# Patient Record
Sex: FEMALE | Race: WHITE | NOT HISPANIC OR LATINO | URBAN - METROPOLITAN AREA
[De-identification: names, ages, dates, MRNs, and addresses within clinical notes are randomized per-mention and may not be internally consistent; named-entity substitution may affect disease eponyms.]

---

## 2017-11-23 ENCOUNTER — EMERGENCY (EMERGENCY)
Facility: HOSPITAL | Age: 77
LOS: 0 days | Discharge: HOME | End: 2017-11-24
Attending: EMERGENCY MEDICINE | Admitting: EMERGENCY MEDICINE
Payer: MEDICARE

## 2017-11-23 VITALS
DIASTOLIC BLOOD PRESSURE: 59 MMHG | SYSTOLIC BLOOD PRESSURE: 148 MMHG | WEIGHT: 140.65 LBS | TEMPERATURE: 98 F | OXYGEN SATURATION: 98 % | HEIGHT: 61 IN | HEART RATE: 81 BPM

## 2017-11-23 VITALS — WEIGHT: 130.07 LBS | HEIGHT: 60 IN

## 2017-11-23 DIAGNOSIS — H11.32 CONJUNCTIVAL HEMORRHAGE, LEFT EYE: ICD-10-CM

## 2017-11-23 DIAGNOSIS — H57.9 UNSPECIFIED DISORDER OF EYE AND ADNEXA: ICD-10-CM

## 2017-11-23 PROBLEM — Z00.00 ENCOUNTER FOR PREVENTIVE HEALTH EXAMINATION: Status: ACTIVE | Noted: 2017-11-23

## 2017-11-23 PROCEDURE — 99283 EMERGENCY DEPT VISIT LOW MDM: CPT | Mod: 25

## 2017-11-23 NOTE — ED PROVIDER NOTE - EYES, MLM
Pupils equal bilaterally.  EOMI.  No evidence of entrapment.  +left eye with diffuse conjunctival hemorrhage more concentrated medially and inferiorly.

## 2017-11-23 NOTE — ED ADULT NURSE NOTE - OBJECTIVE STATEMENT
Patient arrived to ED c/o left eye redness. Patient reports about 1 hr ago she was removing her make up when redness started in corner of eye. Patient reports she is on baby ASA. Denies double vision, blurry vision, trauma to the eye. Denies pain but reports it feels "puffy". No active bleeding at this time. Will see ophthalmologist on call tomorrow.

## 2017-11-23 NOTE — ED PROVIDER NOTE - ATTENDING CONTRIBUTION TO CARE
Attending Contribution to Care: I, Elizabeth Blake, performed the initial face to face bedside interview with this patient regarding history of present illness, review of symptoms and relevant past medical, social and family history.  I completed an independent physical examination.  I was the initial provider who evaluated this patient. I have signed out the follow up of any pending tests (i.e. labs, radiological studies) to the ACP.  I have communicated the patient’s plan of care and disposition with the ACP.

## 2017-11-23 NOTE — ED PROVIDER NOTE - PROGRESS NOTE DETAILS
HECTOR Kim:  Pt seen and examined, she presented with c/o of left eye reddness, started 1 hr PTA. Pt was removing the makeup with a makeup removing cloth and after than she noticed the reddness. Initially it was only medially but later it spread to the entire eye. Pt is on 81 mg ASA. She s/w on call Ophthalmologist Dr. Mahajan and has an appointment with him tomorrow. Denies any visual changes, eye pain or any other trauma or complaint. I checked the IOP and it is 21. [facility] 20/20.

## 2017-11-23 NOTE — ED PROVIDER NOTE - OBJECTIVE STATEMENT
Pt. is a 78 yo F BIB  for red left eye.  Pt. states she was removing her makeup with mild to moderate pressure using a cloth and makeup remover and felt a dull sensation on medial side of left eye and then noticed bleeding and clotting forming.  Pt. takes aspirin daily.  No visual change or double vision.  Called her ophthalmologist Dr. Orourke and Dr. Johnson was on call.  He told her to apply cool compresses and he would see her in the AM.  Pt. got worried as the redness was spreading and getting worse so she came into ED for evaluation.  No fever.  No facial trauma except removing her makeup.

## 2018-08-28 ENCOUNTER — APPOINTMENT (OUTPATIENT)
Dept: UROLOGY | Facility: CLINIC | Age: 78
End: 2018-08-28
Payer: MEDICARE

## 2018-08-28 VITALS
SYSTOLIC BLOOD PRESSURE: 177 MMHG | HEIGHT: 62 IN | DIASTOLIC BLOOD PRESSURE: 102 MMHG | BODY MASS INDEX: 24.84 KG/M2 | TEMPERATURE: 97.7 F | WEIGHT: 135 LBS | HEART RATE: 77 BPM

## 2018-08-28 DIAGNOSIS — Z96.649 PRESENCE OF UNSPECIFIED ARTIFICIAL HIP JOINT: ICD-10-CM

## 2018-08-28 DIAGNOSIS — Z87.891 PERSONAL HISTORY OF NICOTINE DEPENDENCE: ICD-10-CM

## 2018-08-28 PROCEDURE — 99204 OFFICE O/P NEW MOD 45 MIN: CPT | Mod: 25

## 2018-08-28 PROCEDURE — 51798 US URINE CAPACITY MEASURE: CPT

## 2018-08-29 PROBLEM — Z87.891 FORMER SMOKER: Status: ACTIVE | Noted: 2018-08-28

## 2018-08-29 PROBLEM — Z96.649 HISTORY OF HIP REPLACEMENT: Status: RESOLVED | Noted: 2018-08-28 | Resolved: 2018-08-29

## 2018-08-29 LAB
APPEARANCE: CLEAR
BACTERIA: NEGATIVE
BILIRUBIN URINE: NEGATIVE
BLOOD URINE: NEGATIVE
COLOR: YELLOW
GLUCOSE QUALITATIVE U: NEGATIVE MG/DL
HYALINE CASTS: 1 /LPF
KETONES URINE: NEGATIVE
LEUKOCYTE ESTERASE URINE: ABNORMAL
MICROSCOPIC-UA: NORMAL
NITRITE URINE: NEGATIVE
PH URINE: 6.5
PROTEIN URINE: NEGATIVE MG/DL
RED BLOOD CELLS URINE: 0 /HPF
SPECIFIC GRAVITY URINE: 1.01
SQUAMOUS EPITHELIAL CELLS: 2 /HPF
UROBILINOGEN URINE: NEGATIVE MG/DL
WHITE BLOOD CELLS URINE: 1 /HPF

## 2018-08-29 RX ORDER — SIMVASTATIN 80 MG/1
TABLET, FILM COATED ORAL
Refills: 0 | Status: ACTIVE | COMMUNITY

## 2018-08-30 LAB — BACTERIA UR CULT: NORMAL

## 2018-10-01 ENCOUNTER — APPOINTMENT (OUTPATIENT)
Dept: UROLOGY | Facility: CLINIC | Age: 78
End: 2018-10-01

## 2018-10-02 ENCOUNTER — APPOINTMENT (OUTPATIENT)
Dept: UROLOGY | Facility: CLINIC | Age: 78
End: 2018-10-02

## 2018-12-17 ENCOUNTER — OUTPATIENT (OUTPATIENT)
Dept: OUTPATIENT SERVICES | Facility: HOSPITAL | Age: 78
LOS: 1 days | Discharge: HOME | End: 2018-12-17

## 2018-12-17 VITALS
RESPIRATION RATE: 16 BRPM | WEIGHT: 134.04 LBS | TEMPERATURE: 98 F | OXYGEN SATURATION: 96 % | HEIGHT: 62 IN | HEART RATE: 46 BPM | DIASTOLIC BLOOD PRESSURE: 88 MMHG | SYSTOLIC BLOOD PRESSURE: 158 MMHG

## 2018-12-17 VITALS
TEMPERATURE: 98 F | DIASTOLIC BLOOD PRESSURE: 63 MMHG | RESPIRATION RATE: 14 BRPM | SYSTOLIC BLOOD PRESSURE: 115 MMHG | HEART RATE: 66 BPM | OXYGEN SATURATION: 100 %

## 2018-12-17 DIAGNOSIS — Z82.69 FAMILY HISTORY OF OTHER DISEASES OF THE MUSCULOSKELETAL SYSTEM AND CONNECTIVE TISSUE: Chronic | ICD-10-CM

## 2018-12-17 DIAGNOSIS — Z98.1 ARTHRODESIS STATUS: Chronic | ICD-10-CM

## 2018-12-17 RX ORDER — PHENYLEPHRINE HCL 2.5 %
1 DROPS OPHTHALMIC (EYE)
Qty: 0 | Refills: 0 | Status: COMPLETED | OUTPATIENT
Start: 2018-12-17 | End: 2018-12-17

## 2018-12-17 RX ORDER — OFLOXACIN 0.3 %
1 DROPS OPHTHALMIC (EYE)
Qty: 0 | Refills: 0 | Status: COMPLETED | OUTPATIENT
Start: 2018-12-17 | End: 2018-12-17

## 2018-12-17 RX ORDER — ACETAMINOPHEN 500 MG
2 TABLET ORAL
Qty: 0 | Refills: 0 | DISCHARGE
Start: 2018-12-17

## 2018-12-17 RX ORDER — TROPICAMIDE 1 %
1 DROPS OPHTHALMIC (EYE)
Qty: 0 | Refills: 0 | Status: COMPLETED | OUTPATIENT
Start: 2018-12-17 | End: 2018-12-17

## 2018-12-17 RX ORDER — ACETAMINOPHEN 500 MG
650 TABLET ORAL EVERY 6 HOURS
Qty: 0 | Refills: 0 | Status: DISCONTINUED | OUTPATIENT
Start: 2018-12-17 | End: 2019-01-01

## 2018-12-17 RX ADMIN — Medication 1 DROP(S): at 09:47

## 2018-12-17 RX ADMIN — Medication 1 DROP(S): at 10:03

## 2018-12-17 RX ADMIN — Medication 1 DROP(S): at 09:53

## 2018-12-17 RX ADMIN — Medication 1 DROP(S): at 10:04

## 2018-12-17 RX ADMIN — Medication 1 DROP(S): at 09:46

## 2018-12-17 NOTE — ASU PATIENT PROFILE, ADULT - PMH
CAD (coronary artery disease)    Cardiac murmur    GERD (gastroesophageal reflux disease)    HTN (hypertension)    Hypercholesteremia    Mitral regurgitation    Osteopenia    Scoliosis

## 2018-12-17 NOTE — ASU DISCHARGE PLAN (ADULT/PEDIATRIC). - MEDICATION SUMMARY - MEDICATIONS TO TAKE
I will START or STAY ON the medications listed below when I get home from the hospital:    meloxicam 15 mg oral tablet  -- 1 tab(s) by mouth once a day  -- Indication: For CATARACT RIGHT EYE    acetaminophen 325 mg oral tablet  -- 2 tab(s) by mouth every 6 hours, As needed, Mild Pain (1 - 3)  -- Indication: For CATARACT RIGHT EYE    losartan 25 mg oral tablet  -- 1 tab(s) by mouth once a day  -- Indication: For CATARACT RIGHT EYE    simvastatin 10 mg oral tablet  -- 1 tab(s) by mouth once a day (at bedtime)  -- Indication: For CATARACT RIGHT EYE    Bystolic 5 mg oral tablet  -- 1 tab(s) by mouth once a day  -- Indication: For CATARACT RIGHT EYE    Vitamin D3 1000 intl units oral tablet  -- 1 tab(s) by mouth once a day  -- Indication: For CATARACT RIGHT EYE

## 2018-12-17 NOTE — BRIEF OPERATIVE NOTE - PROCEDURE
<<-----Click on this checkbox to enter Procedure Cataract extraction of eye with posterior chamber insertion of intraocular lens  12/17/2018    Active  ESMALL

## 2018-12-17 NOTE — BRIEF OPERATIVE NOTE - PRE-OP DX
Age-related nuclear cataract of right eye  12/17/2018    CIARRA Vela  Regular astigmatism of right eye  12/17/2018    CIARRA Vela

## 2018-12-17 NOTE — ASU DISCHARGE PLAN (ADULT/PEDIATRIC). - SPECIAL INSTRUCTIONS
eye drops as per the instructions  please make your appointment with the [facility] in the office tomorrow [phone]

## 2018-12-21 DIAGNOSIS — K21.9 GASTRO-ESOPHAGEAL REFLUX DISEASE WITHOUT ESOPHAGITIS: ICD-10-CM

## 2018-12-21 DIAGNOSIS — H43.811 VITREOUS DEGENERATION, RIGHT EYE: ICD-10-CM

## 2018-12-21 DIAGNOSIS — Z96.649 PRESENCE OF UNSPECIFIED ARTIFICIAL HIP JOINT: ICD-10-CM

## 2018-12-21 DIAGNOSIS — I10 ESSENTIAL (PRIMARY) HYPERTENSION: ICD-10-CM

## 2018-12-21 DIAGNOSIS — M19.90 UNSPECIFIED OSTEOARTHRITIS, UNSPECIFIED SITE: ICD-10-CM

## 2018-12-21 DIAGNOSIS — E78.5 HYPERLIPIDEMIA, UNSPECIFIED: ICD-10-CM

## 2018-12-21 DIAGNOSIS — I34.1 NONRHEUMATIC MITRAL (VALVE) PROLAPSE: ICD-10-CM

## 2018-12-21 DIAGNOSIS — Z98.1 ARTHRODESIS STATUS: ICD-10-CM

## 2018-12-21 DIAGNOSIS — I25.10 ATHEROSCLEROTIC HEART DISEASE OF NATIVE CORONARY ARTERY WITHOUT ANGINA PECTORIS: ICD-10-CM

## 2018-12-21 DIAGNOSIS — H52.201 UNSPECIFIED ASTIGMATISM, RIGHT EYE: ICD-10-CM

## 2018-12-21 DIAGNOSIS — Z83.3 FAMILY HISTORY OF DIABETES MELLITUS: ICD-10-CM

## 2018-12-21 DIAGNOSIS — H25.13 AGE-RELATED NUCLEAR CATARACT, BILATERAL: ICD-10-CM

## 2018-12-21 DIAGNOSIS — Z86.73 PERSONAL HISTORY OF TRANSIENT ISCHEMIC ATTACK (TIA), AND CEREBRAL INFARCTION WITHOUT RESIDUAL DEFICITS: ICD-10-CM

## 2018-12-21 DIAGNOSIS — Z82.49 FAMILY HISTORY OF ISCHEMIC HEART DISEASE AND OTHER DISEASES OF THE CIRCULATORY SYSTEM: ICD-10-CM

## 2018-12-21 DIAGNOSIS — H26.9 UNSPECIFIED CATARACT: ICD-10-CM

## 2018-12-21 DIAGNOSIS — Z87.891 PERSONAL HISTORY OF NICOTINE DEPENDENCE: ICD-10-CM

## 2018-12-31 PROBLEM — K21.9 GASTRO-ESOPHAGEAL REFLUX DISEASE WITHOUT ESOPHAGITIS: Chronic | Status: ACTIVE | Noted: 2018-12-14

## 2018-12-31 PROBLEM — M41.9 SCOLIOSIS, UNSPECIFIED: Chronic | Status: ACTIVE | Noted: 2018-12-14

## 2018-12-31 PROBLEM — M85.80 OTHER SPECIFIED DISORDERS OF BONE DENSITY AND STRUCTURE, UNSPECIFIED SITE: Chronic | Status: ACTIVE | Noted: 2018-12-14

## 2018-12-31 PROBLEM — I34.0 NONRHEUMATIC MITRAL (VALVE) INSUFFICIENCY: Chronic | Status: ACTIVE | Noted: 2018-12-14

## 2018-12-31 PROBLEM — I10 ESSENTIAL (PRIMARY) HYPERTENSION: Chronic | Status: ACTIVE | Noted: 2018-12-14

## 2018-12-31 PROBLEM — R01.1 CARDIAC MURMUR, UNSPECIFIED: Chronic | Status: ACTIVE | Noted: 2018-12-14

## 2018-12-31 PROBLEM — I25.10 ATHEROSCLEROTIC HEART DISEASE OF NATIVE CORONARY ARTERY WITHOUT ANGINA PECTORIS: Chronic | Status: ACTIVE | Noted: 2018-12-14

## 2018-12-31 PROBLEM — E78.00 PURE HYPERCHOLESTEROLEMIA, UNSPECIFIED: Chronic | Status: ACTIVE | Noted: 2018-12-14

## 2019-01-02 ENCOUNTER — APPOINTMENT (OUTPATIENT)
Dept: DERMATOLOGY | Facility: CLINIC | Age: 79
End: 2019-01-02
Payer: MEDICARE

## 2019-01-02 PROCEDURE — 11900 INJECT SKIN LESIONS </W 7: CPT

## 2019-01-02 PROCEDURE — 99202 OFFICE O/P NEW SF 15 MIN: CPT | Mod: 25

## 2019-01-09 ENCOUNTER — APPOINTMENT (OUTPATIENT)
Dept: DERMATOLOGY | Facility: CLINIC | Age: 79
End: 2019-01-09
Payer: MEDICARE

## 2019-01-09 PROCEDURE — 99212 OFFICE O/P EST SF 10 MIN: CPT | Mod: 25

## 2019-01-09 PROCEDURE — 11900 INJECT SKIN LESIONS </W 7: CPT

## 2019-01-09 NOTE — ASSESSMENT
[FreeTextEntry1] : 1) benign findings as above- education\par \par 2) likely inflamed sebaceous cyst- much improved\par -education\par -2nd round of ILK; if no complete resolution in 2 weeks will bx\par \par Risks and benefits were discussed including including atrophy, discoloration\par Intralesional triamcinolone, concentration 2.5  mg/cc;\par Total volume  0.1    cc\par Sites: left breast\par

## 2019-01-09 NOTE — HISTORY OF PRESENT ILLNESS
[FreeTextEntry1] : rash on left breast [de-identified] : patient with small rash on left breast. feels like a small cyst. much improved from last visit.

## 2019-01-09 NOTE — PHYSICAL EXAM
[FreeTextEntry3] : AAOx3, pleasant, NAD, no visual lymphadenopathy\par hair, scalp, face, nose, eyelids, ears, lips, oropharynx, neck, chest, abdomen, back, right arm, left arm, nails, and hands examined with all normal findings,\par pertinent findings include:\par \par small cystic papule on left breast; much improved from last visit \par multiple benign nevi and lentigines\par \par male  of patient in room for entire visit

## 2019-01-12 RX ORDER — SODIUM CHLORIDE 9 MG/ML
1000 INJECTION, SOLUTION INTRAVENOUS
Qty: 0 | Refills: 0 | Status: DISCONTINUED | OUTPATIENT
Start: 2019-01-15 | End: 2019-01-30

## 2019-01-15 ENCOUNTER — OUTPATIENT (OUTPATIENT)
Dept: OUTPATIENT SERVICES | Facility: HOSPITAL | Age: 79
LOS: 1 days | Discharge: HOME | End: 2019-01-15

## 2019-01-15 VITALS
SYSTOLIC BLOOD PRESSURE: 146 MMHG | RESPIRATION RATE: 16 BRPM | OXYGEN SATURATION: 100 % | TEMPERATURE: 98 F | HEIGHT: 61 IN | HEART RATE: 66 BPM | DIASTOLIC BLOOD PRESSURE: 87 MMHG | WEIGHT: 133.38 LBS

## 2019-01-15 VITALS — TEMPERATURE: 98 F | HEART RATE: 60 BPM | OXYGEN SATURATION: 100 % | RESPIRATION RATE: 16 BRPM

## 2019-01-15 DIAGNOSIS — Z82.69 FAMILY HISTORY OF OTHER DISEASES OF THE MUSCULOSKELETAL SYSTEM AND CONNECTIVE TISSUE: Chronic | ICD-10-CM

## 2019-01-15 DIAGNOSIS — Z98.1 ARTHRODESIS STATUS: Chronic | ICD-10-CM

## 2019-01-15 RX ORDER — PHENYLEPHRINE HCL 2.5 %
1 DROPS OPHTHALMIC (EYE)
Qty: 0 | Refills: 0 | Status: COMPLETED | OUTPATIENT
Start: 2019-01-15 | End: 2019-01-15

## 2019-01-15 RX ORDER — OFLOXACIN 0.3 %
1 DROPS OPHTHALMIC (EYE)
Qty: 0 | Refills: 0 | Status: COMPLETED | OUTPATIENT
Start: 2019-01-15 | End: 2019-01-15

## 2019-01-15 RX ORDER — TROPICAMIDE 1 %
1 DROPS OPHTHALMIC (EYE)
Qty: 0 | Refills: 0 | Status: COMPLETED | OUTPATIENT
Start: 2019-01-15 | End: 2019-01-15

## 2019-01-15 RX ORDER — ACETAMINOPHEN 500 MG
650 TABLET ORAL EVERY 6 HOURS
Qty: 0 | Refills: 0 | Status: DISCONTINUED | OUTPATIENT
Start: 2019-01-15 | End: 2019-01-30

## 2019-01-15 RX ORDER — CHOLECALCIFEROL (VITAMIN D3) 125 MCG
1 CAPSULE ORAL
Qty: 0 | Refills: 0 | COMMUNITY

## 2019-01-15 RX ADMIN — Medication 1 DROP(S): at 08:08

## 2019-01-15 RX ADMIN — Medication 1 DROP(S): at 08:13

## 2019-01-15 RX ADMIN — Medication 1 DROP(S): at 08:09

## 2019-01-15 RX ADMIN — Medication 1 DROP(S): at 08:17

## 2019-01-15 NOTE — ASU DISCHARGE PLAN (ADULT/PEDIATRIC). - MEDICATION SUMMARY - MEDICATIONS TO TAKE
I will START or STAY ON the medications listed below when I get home from the hospital:    meloxicam 15 mg oral tablet  -- 1 tab(s) by mouth once a day  -- Indication: For CATARACT LEFT EYE    acetaminophen 325 mg oral tablet  -- 2 tab(s) by mouth every 6 hours, As needed, Mild Pain (1 - 3)  -- Indication: For CATARACT LEFT EYE    losartan 25 mg oral tablet  -- 1 tab(s) by mouth once a day  -- Indication: For CATARACT LEFT EYE    simvastatin 10 mg oral tablet  -- 1 tab(s) by mouth once a day (at bedtime)  -- Indication: For CATARACT LEFT EYE    Bystolic 5 mg oral tablet  -- 1 tab(s) by mouth once a day  -- Indication: For CATARACT LEFT EYE

## 2019-01-15 NOTE — BRIEF OPERATIVE NOTE - PROCEDURE
<<-----Click on this checkbox to enter Procedure Cataract extraction of eye with posterior chamber insertion of intraocular lens  01/15/2019    Active  ESMALL

## 2019-01-21 DIAGNOSIS — Z86.73 PERSONAL HISTORY OF TRANSIENT ISCHEMIC ATTACK (TIA), AND CEREBRAL INFARCTION WITHOUT RESIDUAL DEFICITS: ICD-10-CM

## 2019-01-21 DIAGNOSIS — I49.9 CARDIAC ARRHYTHMIA, UNSPECIFIED: ICD-10-CM

## 2019-01-21 DIAGNOSIS — H25.12 AGE-RELATED NUCLEAR CATARACT, LEFT EYE: ICD-10-CM

## 2019-01-21 DIAGNOSIS — Z82.49 FAMILY HISTORY OF ISCHEMIC HEART DISEASE AND OTHER DISEASES OF THE CIRCULATORY SYSTEM: ICD-10-CM

## 2019-01-21 DIAGNOSIS — H52.202 UNSPECIFIED ASTIGMATISM, LEFT EYE: ICD-10-CM

## 2019-01-21 DIAGNOSIS — N95.9 UNSPECIFIED MENOPAUSAL AND PERIMENOPAUSAL DISORDER: ICD-10-CM

## 2019-01-21 DIAGNOSIS — Z98.41 CATARACT EXTRACTION STATUS, RIGHT EYE: ICD-10-CM

## 2019-01-21 DIAGNOSIS — Z83.3 FAMILY HISTORY OF DIABETES MELLITUS: ICD-10-CM

## 2019-01-21 DIAGNOSIS — M41.9 SCOLIOSIS, UNSPECIFIED: ICD-10-CM

## 2019-01-21 DIAGNOSIS — H26.9 UNSPECIFIED CATARACT: ICD-10-CM

## 2019-01-21 DIAGNOSIS — Z96.1 PRESENCE OF INTRAOCULAR LENS: ICD-10-CM

## 2019-01-21 DIAGNOSIS — Z98.1 ARTHRODESIS STATUS: ICD-10-CM

## 2019-01-21 DIAGNOSIS — E78.5 HYPERLIPIDEMIA, UNSPECIFIED: ICD-10-CM

## 2019-01-21 DIAGNOSIS — K21.9 GASTRO-ESOPHAGEAL REFLUX DISEASE WITHOUT ESOPHAGITIS: ICD-10-CM

## 2019-01-21 DIAGNOSIS — I37.1 NONRHEUMATIC PULMONARY VALVE INSUFFICIENCY: ICD-10-CM

## 2019-01-21 DIAGNOSIS — R00.2 PALPITATIONS: ICD-10-CM

## 2019-01-21 DIAGNOSIS — Z87.891 PERSONAL HISTORY OF NICOTINE DEPENDENCE: ICD-10-CM

## 2019-01-21 DIAGNOSIS — I70.0 ATHEROSCLEROSIS OF AORTA: ICD-10-CM

## 2019-01-21 DIAGNOSIS — I10 ESSENTIAL (PRIMARY) HYPERTENSION: ICD-10-CM

## 2019-01-21 DIAGNOSIS — I63.9 CEREBRAL INFARCTION, UNSPECIFIED: ICD-10-CM

## 2019-01-21 DIAGNOSIS — Z96.649 PRESENCE OF UNSPECIFIED ARTIFICIAL HIP JOINT: ICD-10-CM

## 2019-01-23 ENCOUNTER — APPOINTMENT (OUTPATIENT)
Dept: DERMATOLOGY | Facility: CLINIC | Age: 79
End: 2019-01-23
Payer: MEDICARE

## 2019-01-23 DIAGNOSIS — L81.4 OTHER MELANIN HYPERPIGMENTATION: ICD-10-CM

## 2019-01-23 DIAGNOSIS — D22.9 MELANOCYTIC NEVI, UNSPECIFIED: ICD-10-CM

## 2019-01-23 PROCEDURE — 99214 OFFICE O/P EST MOD 30 MIN: CPT | Mod: 25

## 2019-01-23 PROCEDURE — 17110 DESTRUCTION B9 LES UP TO 14: CPT

## 2019-01-23 NOTE — HISTORY OF PRESENT ILLNESS
[FreeTextEntry1] : rash on left breast [de-identified] : patient with small rash on left breast. feels like a small cyst. much improved from last visit. also with growth on right shoulder and moles to examine.

## 2019-01-23 NOTE — PHYSICAL EXAM
[FreeTextEntry3] : PE: General: well-nourished, well-developed, nad, aaox3\par Full body skin exam performed examining scalp, head, face, ears, eyes, mouth,\par neck, chest, back, abdomen, axilla, buttock, groin, b/l arms, b/l\par forearms, b/l hands, b/l fingernails, b/l thighs, b/l legs, b/l feet, b/l\par toenails.  Pertinent findings include:\par \par Normal findings include:\par \par Seborrheic keratoses\par Angiomas\par Lentigines\par \par \par small cystic papule on left breast; much improved from last visit; almost completely resolved\par + inflamed, waxy, keratotic papule on right shoulder

## 2019-01-23 NOTE — ASSESSMENT
[FreeTextEntry1] : 1) skin check-\par -benign findings as above\par \par 2) likely inflamed sebaceous cyst- almost completely resolved\par -continue to monitor\par -defers excision for removal and diagnosis\par -will f/u 6 weeks\par \par 3) ISK-\par The specified lesions were treated with liquid nitrogen cryotherapy.  Discussed risks including pain, blistering, crusting, discoloration, recurrence.\par

## 2019-02-04 NOTE — ED ADULT NURSE NOTE - NS ED NOTE ABUSE RESPONSE YN
----- Message from Pilar Toussaint sent at 2/4/2019  9:13 AM CST -----  Type:  Patient Returning Call    Who Called:   Patient  Who Left Message for Patient:  Nano Moss  Does the patient know what this is regarding?:  medication  Best Call Back Number:  [phone]  Additional Information:  Patient would also like to know if she can have pain medication ordered.   Yes

## 2019-03-14 ENCOUNTER — APPOINTMENT (OUTPATIENT)
Dept: DERMATOLOGY | Facility: CLINIC | Age: 79
End: 2019-03-14
Payer: MEDICARE

## 2019-03-14 DIAGNOSIS — D18.00 HEMANGIOMA UNSPECIFIED SITE: ICD-10-CM

## 2019-03-14 DIAGNOSIS — L72.3 SEBACEOUS CYST: ICD-10-CM

## 2019-03-14 DIAGNOSIS — L82.0 INFLAMED SEBORRHEIC KERATOSIS: ICD-10-CM

## 2019-03-14 PROCEDURE — 17110 DESTRUCTION B9 LES UP TO 14: CPT

## 2019-03-14 PROCEDURE — 99213 OFFICE O/P EST LOW 20 MIN: CPT | Mod: 25

## 2019-03-14 NOTE — ASSESSMENT
[FreeTextEntry1] : 1) likely inflamed sebaceous cyst- resolved\par -continue to monitor; discussed to return or if it recurs and enlarges \par -defers excision for removal and diagnosis\par \par 2) ISK-\par The specified lesions were treated with liquid nitrogen cryotherapy.  Discussed risks including pain, blistering, crusting, discoloration, recurrence.\par \par 3) angioma-\par -education\par \par RTC 6 months

## 2019-03-14 NOTE — HISTORY OF PRESENT ILLNESS
[FreeTextEntry1] : rash on left breast [de-identified] : patient with small rash on left breast. feels like a small cyst. much improved from last visit. \par also with growth on right torso.

## 2019-03-14 NOTE — PHYSICAL EXAM
[FreeTextEntry3] : AAOx3, pleasant, NAD, no visual lymphadenopathy\par hair, scalp, face, nose, eyelids, ears, lips, oropharynx, neck, chest, abdomen, back, right arm, left arm, nails, and hands examined with all normal findings,\par pertinent findings include:\par \par small cystic papule on left breast; resolved\par + inflamed, waxy, keratotic papule on right under breast\par small angioma on torso

## 2019-04-22 ENCOUNTER — FORM ENCOUNTER (OUTPATIENT)
Age: 79
End: 2019-04-22

## 2019-04-23 ENCOUNTER — OUTPATIENT (OUTPATIENT)
Dept: OUTPATIENT SERVICES | Facility: HOSPITAL | Age: 79
LOS: 1 days | End: 2019-04-23
Payer: MEDICARE

## 2019-04-23 ENCOUNTER — APPOINTMENT (OUTPATIENT)
Age: 79
End: 2019-04-23
Payer: MEDICARE

## 2019-04-23 ENCOUNTER — APPOINTMENT (OUTPATIENT)
Dept: ULTRASOUND IMAGING | Facility: CLINIC | Age: 79
End: 2019-04-23
Payer: MEDICARE

## 2019-04-23 VITALS
OXYGEN SATURATION: 98 % | TEMPERATURE: 97.5 F | BODY MASS INDEX: 24.84 KG/M2 | SYSTOLIC BLOOD PRESSURE: 171 MMHG | HEART RATE: 64 BPM | WEIGHT: 135 LBS | HEIGHT: 62 IN | DIASTOLIC BLOOD PRESSURE: 82 MMHG

## 2019-04-23 DIAGNOSIS — Z00.8 ENCOUNTER FOR OTHER GENERAL EXAMINATION: ICD-10-CM

## 2019-04-23 DIAGNOSIS — R10.9 UNSPECIFIED ABDOMINAL PAIN: ICD-10-CM

## 2019-04-23 DIAGNOSIS — Z82.69 FAMILY HISTORY OF OTHER DISEASES OF THE MUSCULOSKELETAL SYSTEM AND CONNECTIVE TISSUE: Chronic | ICD-10-CM

## 2019-04-23 DIAGNOSIS — Z98.1 ARTHRODESIS STATUS: Chronic | ICD-10-CM

## 2019-04-23 PROCEDURE — 76770 US EXAM ABDO BACK WALL COMP: CPT | Mod: 26

## 2019-04-23 PROCEDURE — 51798 US URINE CAPACITY MEASURE: CPT

## 2019-04-23 PROCEDURE — 99213 OFFICE O/P EST LOW 20 MIN: CPT | Mod: 25

## 2019-04-23 PROCEDURE — 76770 US EXAM ABDO BACK WALL COMP: CPT

## 2019-04-23 NOTE — HISTORY OF PRESENT ILLNESS
[FreeTextEntry1] : 78 year old woman with complaint of daytime frequency and nocturia 4-6x/night. This began months ago. \par It is mild in severity. Nothing makes the symptoms better, nothing makes sx worse. \par It is associated with constipation. She denies loud snoring, lower extremity edema, or orthopnea.\par No hematuria, no dysuria, no urgency, no hesitancy, no straining. No incontinence. \par No fevers, no chills, no nausea, no vomiting, no flank pain. No family history contributory to urinary frequency.\par \par 04/23/2019: Patient presents for follow up. She reports  symptoms and other medical  issues remain unchanged from above. She still complains of nocturia 4-6x/night. She performed voiding diary, but did not accurately record volumes, only times. She also reports new bilateral flank pain, associated with voiding. No hematuria, no dysuria, no urgency, no hesitancy, no straining. No incontinence. \par No fevers, no chills, no nausea, no vomiting, no flank pain. No family history contributory to urinary frequency. PVR 1 mL.

## 2019-04-23 NOTE — REVIEW OF SYSTEMS
[Eyesight Problems] : eyesight problems [Constipation] : constipation [Wake up at night to urinate  How many times?  ___] : wakes up to urinate [unfilled] times during the night [Date of last menstrual period ____] : date of last menstrual period: [unfilled] [Strong urge to urinate] : strong urge to urinate [Difficulty Walking] : difficulty walking [see HPI] : see HPI [Muscle Weakness] : muscle weakness [Feelings Of Weakness] : feelings of weakness [Negative] : Psychiatric [FreeTextEntry6] : frequent urination 5-7

## 2019-04-23 NOTE — PHYSICAL EXAM
[General Appearance - Well Nourished] : well nourished [General Appearance - Well Developed] : well developed [Normal Appearance] : normal appearance [Well Groomed] : well groomed [General Appearance - In No Acute Distress] : no acute distress [Costovertebral Angle Tenderness] : no ~M costovertebral angle tenderness [Abdomen Soft] : soft [Abdomen Tenderness] : non-tender [Urinary Bladder Findings] : the bladder was normal on palpation [] : no respiratory distress [Edema] : no peripheral edema [Respiration, Rhythm And Depth] : normal respiratory rhythm and effort [Exaggerated Use Of Accessory Muscles For Inspiration] : no accessory muscle use [Oriented To Time, Place, And Person] : oriented to person, place, and time [Affect] : the affect was normal [Mood] : the mood was normal [Not Anxious] : not anxious [No Focal Deficits] : no focal deficits [Normal Station and Gait] : the gait and station were normal for the patient's age [No Palpable Adenopathy] : no palpable adenopathy

## 2019-04-23 NOTE — ASSESSMENT
[FreeTextEntry1] : 80 yo F with frequency and nocturia. Voiding diary was cognitively difficult for patient. Will start with Rx of oxybutynin to see if improves her nocturia. if not, will consider repeating voiding diary with better instructions.\par \par For flank pain, will obtain renal US.

## 2019-04-24 LAB
APPEARANCE: CLEAR
BACTERIA: NEGATIVE
BILIRUBIN URINE: NEGATIVE
BLOOD URINE: NEGATIVE
COLOR: YELLOW
GLUCOSE QUALITATIVE U: NEGATIVE
HYALINE CASTS: 1 /LPF
KETONES URINE: NEGATIVE
LEUKOCYTE ESTERASE URINE: ABNORMAL
MICROSCOPIC-UA: NORMAL
NITRITE URINE: NEGATIVE
PH URINE: 6
PROTEIN URINE: NEGATIVE
RED BLOOD CELLS URINE: 2 /HPF
SPECIFIC GRAVITY URINE: 1.02
SQUAMOUS EPITHELIAL CELLS: 3 /HPF
UROBILINOGEN URINE: NORMAL
WHITE BLOOD CELLS URINE: 5 /HPF

## 2019-04-25 LAB — BACTERIA UR CULT: NORMAL

## 2019-05-16 ENCOUNTER — RX CHANGE (OUTPATIENT)
Age: 79
End: 2019-05-16

## 2019-06-04 ENCOUNTER — APPOINTMENT (OUTPATIENT)
Dept: UROLOGY | Facility: CLINIC | Age: 79
End: 2019-06-04
Payer: MEDICARE

## 2019-06-04 VITALS
RESPIRATION RATE: 16 BRPM | WEIGHT: 135 LBS | BODY MASS INDEX: 24.84 KG/M2 | HEART RATE: 56 BPM | SYSTOLIC BLOOD PRESSURE: 147 MMHG | HEIGHT: 62 IN | DIASTOLIC BLOOD PRESSURE: 87 MMHG | OXYGEN SATURATION: 97 %

## 2019-06-04 PROCEDURE — 99213 OFFICE O/P EST LOW 20 MIN: CPT

## 2019-06-04 NOTE — ASSESSMENT
[FreeTextEntry1] : 80 yo F with frequency and nocturia. Recommended bowel regimen to improve constipation, which will in turn improve voiding problems. Will start with metamucil. If does not improve, will consider GI consult. For OAB symptoms, recommended continuing oxybutynin for now and repeating voiding diary. Pt had difficulty with measurements, but recommended just documenting frequency to show pt there has been improvement.

## 2019-06-04 NOTE — HISTORY OF PRESENT ILLNESS
[FreeTextEntry1] : 78 year old woman with complaint of daytime frequency and nocturia 4-6x/night. This began months ago. \par It is mild in severity. Nothing makes the symptoms better, nothing makes sx worse. \par It is associated with constipation. She denies loud snoring, lower extremity edema, or orthopnea.\par No hematuria, no dysuria, no urgency, no hesitancy, no straining. No incontinence. \par No fevers, no chills, no nausea, no vomiting, no flank pain. No family history contributory to urinary frequency.\par \par 04/23/2019: Patient presents for follow up. She reports  symptoms and other medical  issues remain unchanged from above. She still complains of nocturia 4-6x/night. She performed voiding diary, but did not accurately record volumes, only times. She also reports new bilateral flank pain, associated with voiding. No hematuria, no dysuria, no urgency, no hesitancy, no straining. No incontinence. \par No fevers, no chills, no nausea, no vomiting, no flank pain. No family history contributory to urinary frequency. PVR 1 mL. \par \par 06/04/2019: Patient presents for follow up. She gives conflicting history, at first saying that her  symptoms are unchanged. Further questioning she reports that daytime frequency and urgency have improved, and nocturia is down to 2-3x/night. She also reports significnat constipation, with hard bowel movements every 3-4 days. No hematuria, no dysuria, no urgency, no hesitancy, no straining. No incontinence. No fevers, no chills, no nausea, no vomiting, no flank pain.\par Renal US negative

## 2019-06-04 NOTE — PHYSICAL EXAM
[General Appearance - Well Developed] : well developed [General Appearance - Well Nourished] : well nourished [Normal Appearance] : normal appearance [Well Groomed] : well groomed [General Appearance - In No Acute Distress] : no acute distress [Abdomen Soft] : soft [Costovertebral Angle Tenderness] : no ~M costovertebral angle tenderness [Abdomen Tenderness] : non-tender [Urinary Bladder Findings] : the bladder was normal on palpation [Edema] : no peripheral edema [Respiration, Rhythm And Depth] : normal respiratory rhythm and effort [] : no respiratory distress [Oriented To Time, Place, And Person] : oriented to person, place, and time [Exaggerated Use Of Accessory Muscles For Inspiration] : no accessory muscle use [Mood] : the mood was normal [Affect] : the affect was normal [Not Anxious] : not anxious [Normal Station and Gait] : the gait and station were normal for the patient's age [No Focal Deficits] : no focal deficits [No Palpable Adenopathy] : no palpable adenopathy

## 2019-07-31 ENCOUNTER — APPOINTMENT (OUTPATIENT)
Dept: RHEUMATOLOGY | Facility: CLINIC | Age: 79
End: 2019-07-31
Payer: MEDICARE

## 2019-07-31 VITALS
HEART RATE: 65 BPM | WEIGHT: 130 LBS | HEIGHT: 62 IN | DIASTOLIC BLOOD PRESSURE: 74 MMHG | OXYGEN SATURATION: 94 % | BODY MASS INDEX: 23.92 KG/M2 | SYSTOLIC BLOOD PRESSURE: 109 MMHG

## 2019-07-31 DIAGNOSIS — Z82.69 FAMILY HISTORY OF OTHER DISEASES OF THE MUSCULOSKELETAL SYSTEM AND CONNECTIVE TISSUE: ICD-10-CM

## 2019-07-31 PROCEDURE — 99204 OFFICE O/P NEW MOD 45 MIN: CPT

## 2019-07-31 RX ORDER — LOSARTAN POTASSIUM 100 MG/1
TABLET, FILM COATED ORAL
Refills: 0 | Status: DISCONTINUED | COMMUNITY
End: 2019-07-31

## 2019-07-31 NOTE — ASSESSMENT
[FreeTextEntry1] : 79-year-old  female with a history of hypertension and hip osteoarthritis status post bilateral hip replacements presents for further evaluation of a positive CANDIDA.\par \par Positive CANDIDA-\par -I reviewed her labs her CANDIDA is positive at ARNP antibody. She also has a negative rheumatoid factor with weakly positive CCP antibody of 24.\par -Her current review of systems is positive for joint pains which is most likely secondary to osteoarthritis. 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 which is an overlap of scleroderma, lupus and myositis.\par -She does not necessarily fulfill criteria for a connective tissue disease but given the dysphagia she should have a baseline esophagram\par -She is also to have a baseline chest x-ray\par -I have requested copies of her last echocardiogram\par -Currently there is no indication for additional medication\par \par Positive CCP antibody-\par -her CCP antibodies weakly positive\par -She has features of osteoarthritis\par -At this time she does not have rheumatoid arthritis\par \par Unsteady gait-\par -recommend use of a cane\par -To resume physical therapy\par \par Followup 3-4 weeks. She is aware to call if her symptoms worsen\par

## 2019-07-31 NOTE — PHYSICAL EXAM
[General Appearance - Alert] : alert [General Appearance - Well Nourished] : well nourished [General Appearance - Well Developed] : well developed [Sclera] : the sclera and conjunctiva were normal [Oropharynx] : the oropharynx was normal [Neck Appearance] : the appearance of the neck was normal [Respiration, Rhythm And Depth] : normal respiratory rhythm and effort [Auscultation Breath Sounds / Voice Sounds] : lungs were clear to auscultation bilaterally [Heart Sounds] : normal S1 and S2 [Full Pulse] : the pedal pulses are present [Edema] : there was no peripheral edema [Abdomen Tenderness] : non-tender [Cervical Lymph Nodes Enlarged Anterior Bilaterally] : anterior cervical [Supraclavicular Lymph Nodes Enlarged Bilaterally] : supraclavicular [No Spinal Tenderness] : no spinal tenderness [Abnormal Walk] : normal gait [Nail Clubbing] : no clubbing  or cyanosis of the fingernails [Motor Tone] : muscle strength and tone were normal [] : no rash [Motor Exam] : the motor exam was normal [Oriented To Time, Place, And Person] : oriented to person, place, and time [Impaired Insight] : insight and judgment were intact [Affect] : the affect was normal [FreeTextEntry1] : b/l THR, hands with CMC OA, feet with Oa, + scoliosis, knees with crepitus

## 2019-07-31 NOTE — CONSULT LETTER
[Dear  ___] : Dear  [unfilled], [Consult Letter:] : I had the pleasure of evaluating your patient, [unfilled]. [Please see my note below.] : Please see my note below. [Consult Closing:] : Thank you very much for allowing me to participate in the care of this patient.  If you have any questions, please do not hesitate to contact me. [Sincerely,] : Sincerely, [FreeTextEntry3] : Janes Andre D.O\par

## 2019-07-31 NOTE — HISTORY OF PRESENT ILLNESS
[FreeTextEntry1] : 79 year old  female with a history of hypertension presents for further evaluation of a positive CANDIDA. The CANDIDA was drawn during a routine visit by her primary care physician when she was complaining of joint pains.\par \par I reviewed her labs, her CANDIDA is positive, she has a RNP antibody, her CCP antibody was weakly positive at 24 with a negative rheumatoid factor. She has normal sedimentation rate and CRP.\par \par She complains of pain in her hands knees and feet. She states that she has relief with Tylenol and Advil. Her granddaughter has spondyloarthropathy and she wants to be ruled out.\par \par She admits to long-standing history of Raynaud's phenomenon. She denies alopecia, oral ulcers, sicca symptoms. She denies muscle pain no muscle weakness. She admits to dysphagia to solids.\par \par She has a good appetite and denies weight loss. She denies fatigue. She denies fevers or chills or night sweats. She has had some issues with gait instability, physical therapy did help. She is willing to try it again.

## 2019-08-08 ENCOUNTER — APPOINTMENT (OUTPATIENT)
Dept: INTERNAL MEDICINE | Facility: CLINIC | Age: 79
End: 2019-08-08
Payer: MEDICARE

## 2019-08-08 VITALS
HEIGHT: 62 IN | BODY MASS INDEX: 23.92 KG/M2 | TEMPERATURE: 98.4 F | RESPIRATION RATE: 18 BRPM | WEIGHT: 130 LBS | DIASTOLIC BLOOD PRESSURE: 68 MMHG | OXYGEN SATURATION: 94 % | HEART RATE: 73 BPM | SYSTOLIC BLOOD PRESSURE: 110 MMHG

## 2019-08-08 DIAGNOSIS — R09.89 OTHER SPECIFIED SYMPTOMS AND SIGNS INVOLVING THE CIRCULATORY AND RESPIRATORY SYSTEMS: ICD-10-CM

## 2019-08-08 DIAGNOSIS — R06.09 OTHER FORMS OF DYSPNEA: ICD-10-CM

## 2019-08-08 PROCEDURE — 99205 OFFICE O/P NEW HI 60 MIN: CPT | Mod: 25

## 2019-08-08 PROCEDURE — ZZZZZ: CPT

## 2019-08-08 PROCEDURE — 94727 GAS DIL/WSHOT DETER LNG VOL: CPT

## 2019-08-08 PROCEDURE — 94729 DIFFUSING CAPACITY: CPT

## 2019-08-08 PROCEDURE — 94060 EVALUATION OF WHEEZING: CPT

## 2019-08-08 NOTE — PHYSICAL EXAM
[General Appearance - Well Developed] : well developed [Normal Appearance] : normal appearance [Well Groomed] : well groomed [General Appearance - Well Nourished] : well nourished [No Deformities] : no deformities [General Appearance - In No Acute Distress] : no acute distress [Normal Conjunctiva] : the conjunctiva exhibited no abnormalities [Eyelids - No Xanthelasma] : the eyelids demonstrated no xanthelasmas [Normal Oropharynx] : normal oropharynx [Neck Appearance] : the appearance of the neck was normal [Neck Cervical Mass (___cm)] : no neck mass was observed [Thyroid Diffuse Enlargement] : the thyroid was not enlarged [Jugular Venous Distention Increased] : there was no jugular-venous distention [Thyroid Nodule] : there were no palpable thyroid nodules [Heart Rate And Rhythm] : heart rate and rhythm were normal [Heart Sounds] : normal S1 and S2 [Murmurs] : no murmurs present [Respiration, Rhythm And Depth] : normal respiratory rhythm and effort [Auscultation Breath Sounds / Voice Sounds] : lungs were clear to auscultation bilaterally [Exaggerated Use Of Accessory Muscles For Inspiration] : no accessory muscle use [Abdomen Tenderness] : non-tender [Abdomen Soft] : soft [Abdomen Mass (___ Cm)] : no abdominal mass palpated [Gait - Sufficient For Exercise Testing] : the gait was sufficient for exercise testing [Abnormal Walk] : normal gait [Cyanosis, Localized] : no localized cyanosis [Nail Clubbing] : no clubbing of the fingernails [Petechial Hemorrhages (___cm)] : no petechial hemorrhages [Skin Color & Pigmentation] : normal skin color and pigmentation [Skin Turgor] : normal skin turgor [Deep Tendon Reflexes (DTR)] : deep tendon reflexes were 2+ and symmetric [] : no rash [Sensation] : the sensory exam was normal to light touch and pinprick [No Focal Deficits] : no focal deficits [Impaired Insight] : insight and judgment were intact [Oriented To Time, Place, And Person] : oriented to person, place, and time [Affect] : the affect was normal

## 2019-08-08 NOTE — ASSESSMENT
[FreeTextEntry1] : Mrs. Ontiveros is an 79-year-old female presents for initial pulmonary evaluation. She does have mild shortness of breath with exertion. She is currently undergoing a cause of this disease workup by rheumatology. Chest x-ray telemetry showed "fluid" in her lungs. Completely function tests are negative. The patient is asymptomatic. She does not require treatment at this time. She will followup in this office on an as-needed basis.

## 2019-08-08 NOTE — HISTORY OF PRESENT ILLNESS
[FreeTextEntry1] : Mrs. Ontiveros is an 79-year-old female who presents for initial pulmonary evaluation. She is accompanied by her daughter. Mrs. Ontiveros is currently undergoing extensive workup for a collagen vascular disorder. She had had blood work done in May which showed a weakly positive CCP antibody with a negative CANDIDA. Patient has had some history of joint pain. Mrs. Jimenez is no previous history of asthma seasonal allergic rhinitis. She is complaining of some episodes of dysphagia as well. She states that she will have intermittent episodes where it feels as if food gets caught in her throat. She does get some shortness of breath with exertion however symptoms of the breast. Patient has no nocturnal symptoms of cough or dyspnea. Chest x-ray apparently showed "fluid" in her lungs. She hashistory of cardiomyopathy or congestive heart failure. Patient denies symptoms of orthopnea or paroxysmal nocturnal dyspnea.

## 2019-08-09 ENCOUNTER — APPOINTMENT (OUTPATIENT)
Dept: INTERNAL MEDICINE | Facility: CLINIC | Age: 79
End: 2019-08-09

## 2019-08-14 ENCOUNTER — LABORATORY RESULT (OUTPATIENT)
Age: 79
End: 2019-08-14

## 2019-08-14 ENCOUNTER — APPOINTMENT (OUTPATIENT)
Dept: INTERNAL MEDICINE | Facility: CLINIC | Age: 79
End: 2019-08-14

## 2019-08-19 LAB
25(OH)D3 SERPL-MCNC: 51.6 NG/ML
ALBUMIN SERPL ELPH-MCNC: 4.7 G/DL
ALP BLD-CCNC: 55 U/L
ALT SERPL-CCNC: 12 U/L
ANA SER IF-ACNC: NEGATIVE
ANION GAP SERPL CALC-SCNC: 14 MMOL/L
AST SERPL-CCNC: 24 U/L
BASOPHILS # BLD AUTO: 0.08 K/UL
BASOPHILS NFR BLD AUTO: 1 %
BILIRUB SERPL-MCNC: 0.4 MG/DL
BUN SERPL-MCNC: 27 MG/DL
CALCIUM SERPL-MCNC: 9.8 MG/DL
CARDIOLIPIN IGM SER-MCNC: 8.7 MPL
CARDIOLIPIN IGM SER-MCNC: <5 GPL
CCP AB SER IA-ACNC: 12 UNITS
CENTROMERE IGG SER-ACNC: <0.2 CD:130001892
CHLORIDE SERPL-SCNC: 103 MMOL/L
CK SERPL-CCNC: 152 U/L
CO2 SERPL-SCNC: 20 MMOL/L
CREAT SERPL-MCNC: 1.29 MG/DL
CREAT SPEC-SCNC: 100 MG/DL
CREAT/PROT UR: 0.1 RATIO
CRP SERPL-MCNC: <0.1 MG/DL
DSDNA AB SER-ACNC: <12 IU/ML
ENA RNP AB SER IA-ACNC: 5.3 AL
ENA SM AB SER IA-ACNC: <0.2 AL
ENA SS-A AB SER IA-ACNC: <0.2 AL
ENA SS-B AB SER IA-ACNC: <0.2 AL
EOSINOPHIL # BLD AUTO: 0.19 K/UL
EOSINOPHIL NFR BLD AUTO: 2.3 %
ERYTHROCYTE [SEDIMENTATION RATE] IN BLOOD BY WESTERGREN METHOD: 26 MM/HR
GLUCOSE SERPL-MCNC: 84 MG/DL
HCT VFR BLD CALC: 37 %
HGB BLD-MCNC: 12.2 G/DL
IMM GRANULOCYTES NFR BLD AUTO: 0.4 %
LYMPHOCYTES # BLD AUTO: 2.26 K/UL
LYMPHOCYTES NFR BLD AUTO: 27.3 %
MAN DIFF?: NORMAL
MCHC RBC-ENTMCNC: 29 PG
MCHC RBC-ENTMCNC: 33 GM/DL
MCV RBC AUTO: 87.9 FL
MONOCYTES # BLD AUTO: 0.82 K/UL
MONOCYTES NFR BLD AUTO: 9.9 %
NEUTROPHILS # BLD AUTO: 4.91 K/UL
NEUTROPHILS NFR BLD AUTO: 59.1 %
PLATELET # BLD AUTO: 246 K/UL
POTASSIUM SERPL-SCNC: 4.8 MMOL/L
PROT SERPL-MCNC: 6.7 G/DL
PROT UR-MCNC: 12 MG/DL
RBC # BLD: 4.21 M/UL
RBC # FLD: 13.2 %
RF+CCP IGG SER-IMP: NEGATIVE
RHEUMATOID FACT SER QL: <10 IU/ML
SODIUM SERPL-SCNC: 137 MMOL/L
THYROGLOB AB SERPL-ACNC: <20 IU/ML
THYROPEROXIDASE AB SERPL IA-ACNC: <10 IU/ML
WBC # FLD AUTO: 8.29 K/UL

## 2019-08-22 ENCOUNTER — APPOINTMENT (OUTPATIENT)
Age: 79
End: 2019-08-22
Payer: MEDICARE

## 2019-08-22 PROCEDURE — 99213 OFFICE O/P EST LOW 20 MIN: CPT

## 2019-08-22 RX ORDER — OXYBUTYNIN CHLORIDE 5 MG/1
5 TABLET ORAL
Qty: 30 | Refills: 3 | Status: DISCONTINUED | COMMUNITY
Start: 2019-04-23 | End: 2019-08-22

## 2019-08-22 NOTE — ASSESSMENT
[FreeTextEntry1] : 80 yo F with frequency and nocturia. Discussed switching to ER formulation of oxybutynin for control of daytime and nighttime symptoms. Pt agrees. We discussed may need to increase dose if symptoms are suboptimally controlled.

## 2019-08-22 NOTE — HISTORY OF PRESENT ILLNESS
[FreeTextEntry1] : 78 year old woman with complaint of daytime frequency and nocturia 4-6x/night. This began months ago. \par It is mild in severity. Nothing makes the symptoms better, nothing makes sx worse. \par It is associated with constipation. She denies loud snoring, lower extremity edema, or orthopnea.\par No hematuria, no dysuria, no urgency, no hesitancy, no straining. No incontinence. \par No fevers, no chills, no nausea, no vomiting, no flank pain. No family history contributory to urinary frequency.\par \par 04/23/2019: Patient presents for follow up. She reports  symptoms and other medical  issues remain unchanged from above. She still complains of nocturia 4-6x/night. She performed voiding diary, but did not accurately record volumes, only times. She also reports new bilateral flank pain, associated with voiding. No hematuria, no dysuria, no urgency, no hesitancy, no straining. No incontinence. \par No fevers, no chills, no nausea, no vomiting, no flank pain. No family history contributory to urinary frequency. PVR 1 mL. \par \par 06/04/2019: Patient presents for follow up. She gives conflicting history, at first saying that her  symptoms are unchanged. Further questioning she reports that daytime frequency and urgency have improved, and nocturia is down to 2-3x/night. She also reports significnat constipation, with hard bowel movements every 3-4 days. No hematuria, no dysuria, no urgency, no hesitancy, no straining. No incontinence. No fevers, no chills, no nausea, no vomiting, no flank pain.\par Renal US negative\par \par 08/22/2019: Patient presents for follow up. She reports daytime frequency and urgency remain bothersome. She is taking oxybutynin IR QHS.  No hematuria, no dysuria, no urgency, no hesitancy, no straining. No incontinence. No fevers, no chills, no nausea, no vomiting, no flank pain.

## 2019-08-22 NOTE — REVIEW OF SYSTEMS
[Eyesight Problems] : eyesight problems [Constipation] : constipation [Date of last menstrual period ____] : date of last menstrual period: [unfilled] [Wake up at night to urinate  How many times?  ___] : wakes up to urinate [unfilled] times during the night [Strong urge to urinate] : strong urge to urinate [Difficulty Walking] : difficulty walking [see HPI] : see HPI [Muscle Weakness] : muscle weakness [Feelings Of Weakness] : feelings of weakness [Negative] : Heme/Lymph [FreeTextEntry6] : frequent urination 5-7

## 2019-08-27 ENCOUNTER — APPOINTMENT (OUTPATIENT)
Dept: RHEUMATOLOGY | Facility: CLINIC | Age: 79
End: 2019-08-27

## 2019-09-09 ENCOUNTER — RX RENEWAL (OUTPATIENT)
Age: 79
End: 2019-09-09

## 2019-09-16 ENCOUNTER — RX CHANGE (OUTPATIENT)
Age: 79
End: 2019-09-16

## 2019-09-24 ENCOUNTER — APPOINTMENT (OUTPATIENT)
Dept: UROLOGY | Facility: CLINIC | Age: 79
End: 2019-09-24
Payer: MEDICARE

## 2019-09-24 VITALS
OXYGEN SATURATION: 96 % | HEART RATE: 67 BPM | SYSTOLIC BLOOD PRESSURE: 89 MMHG | TEMPERATURE: 98.2 F | DIASTOLIC BLOOD PRESSURE: 61 MMHG | WEIGHT: 130 LBS | HEIGHT: 62 IN | BODY MASS INDEX: 23.92 KG/M2

## 2019-09-24 PROCEDURE — 99213 OFFICE O/P EST LOW 20 MIN: CPT

## 2019-09-24 NOTE — REVIEW OF SYSTEMS
[Eyesight Problems] : eyesight problems [Constipation] : constipation [Wake up at night to urinate  How many times?  ___] : wakes up to urinate [unfilled] times during the night [Date of last menstrual period ____] : date of last menstrual period: [unfilled] [Strong urge to urinate] : strong urge to urinate [Difficulty Walking] : difficulty walking [see HPI] : see HPI [Feelings Of Weakness] : feelings of weakness [Muscle Weakness] : muscle weakness [Negative] : Heme/Lymph [FreeTextEntry6] : frequent urination 5-7

## 2019-09-24 NOTE — PHYSICAL EXAM
[General Appearance - Well Developed] : well developed [Normal Appearance] : normal appearance [General Appearance - Well Nourished] : well nourished [General Appearance - In No Acute Distress] : no acute distress [Well Groomed] : well groomed [Abdomen Soft] : soft [Abdomen Tenderness] : non-tender [Costovertebral Angle Tenderness] : no ~M costovertebral angle tenderness [Urinary Bladder Findings] : the bladder was normal on palpation [Edema] : no peripheral edema [Respiration, Rhythm And Depth] : normal respiratory rhythm and effort [] : no respiratory distress [Oriented To Time, Place, And Person] : oriented to person, place, and time [Exaggerated Use Of Accessory Muscles For Inspiration] : no accessory muscle use [Affect] : the affect was normal [Mood] : the mood was normal [Normal Station and Gait] : the gait and station were normal for the patient's age [Not Anxious] : not anxious [No Palpable Adenopathy] : no palpable adenopathy [No Focal Deficits] : no focal deficits

## 2019-09-24 NOTE — HISTORY OF PRESENT ILLNESS
[FreeTextEntry1] : 78 year old woman with complaint of daytime frequency and nocturia 4-6x/night. This began months ago. \par It is mild in severity. Nothing makes the symptoms better, nothing makes sx worse. \par It is associated with constipation. She denies loud snoring, lower extremity edema, or orthopnea.\par No hematuria, no dysuria, no urgency, no hesitancy, no straining. No incontinence. \par No fevers, no chills, no nausea, no vomiting, no flank pain. No family history contributory to urinary frequency.\par \par 04/23/2019: Patient presents for follow up. She reports  symptoms and other medical  issues remain unchanged from above. She still complains of nocturia 4-6x/night. She performed voiding diary, but did not accurately record volumes, only times. She also reports new bilateral flank pain, associated with voiding. No hematuria, no dysuria, no urgency, no hesitancy, no straining. No incontinence. \par No fevers, no chills, no nausea, no vomiting, no flank pain. No family history contributory to urinary frequency. PVR 1 mL. \par \par 06/04/2019: Patient presents for follow up. She gives conflicting history, at first saying that her  symptoms are unchanged. Further questioning she reports that daytime frequency and urgency have improved, and nocturia is down to 2-3x/night. She also reports significnat constipation, with hard bowel movements every 3-4 days. No hematuria, no dysuria, no urgency, no hesitancy, no straining. No incontinence. No fevers, no chills, no nausea, no vomiting, no flank pain.\par Renal US negative\par \par 08/22/2019: Patient presents for follow up. She reports daytime frequency and urgency remain bothersome. She is taking oxybutynin IR QHS.  No hematuria, no dysuria, no urgency, no hesitancy, no straining. No incontinence. No fevers, no chills, no nausea, no vomiting, no flank pain.\par \par 09/24/2019: Patient presents for follow up. She reports daytime urgency improved, nocturia down to 4x/night from 7-8x/night on oxybutynin 5 mg ER. No hematuria, no dysuria, no hesitancy, no straining. No incontinence. No fevers, no chills, no nausea, no vomiting, no flank pain.

## 2019-09-24 NOTE — ASSESSMENT
[FreeTextEntry1] : 80 yo F with frequency and nocturia. She reports some improvement on 5 mg oxybutynin ER. We discussed increasing dose for better control but pt declines at this time. Overall, more comfortable.

## 2019-09-27 ENCOUNTER — APPOINTMENT (OUTPATIENT)
Dept: RHEUMATOLOGY | Facility: CLINIC | Age: 79
End: 2019-09-27
Payer: MEDICARE

## 2019-09-27 VITALS
BODY MASS INDEX: 23.92 KG/M2 | HEART RATE: 64 BPM | HEIGHT: 62 IN | OXYGEN SATURATION: 96 % | DIASTOLIC BLOOD PRESSURE: 70 MMHG | SYSTOLIC BLOOD PRESSURE: 114 MMHG | WEIGHT: 130 LBS

## 2019-09-27 DIAGNOSIS — M15.9 POLYOSTEOARTHRITIS, UNSPECIFIED: ICD-10-CM

## 2019-09-27 PROCEDURE — 99214 OFFICE O/P EST MOD 30 MIN: CPT

## 2019-09-27 RX ORDER — AMLODIPINE BESYLATE 2.5 MG/1
2.5 TABLET ORAL
Refills: 0 | Status: DISCONTINUED | COMMUNITY
Start: 2019-07-31 | End: 2019-09-27

## 2019-09-27 RX ORDER — NEBIVOLOL HYDROCHLORIDE 10 MG/1
10 TABLET ORAL
Refills: 0 | Status: DISCONTINUED | COMMUNITY
End: 2019-09-27

## 2019-09-27 NOTE — ASSESSMENT
[FreeTextEntry1] : 79-year-old  female with a history of hypertension and hip osteoarthritis status post bilateral hip replacements presents for further evaluation of a positive CANDIDA.\par \par Positive CANDIDA-\par -I reviewed her labs her CANDIDA is positive  with a RNP antibody. She also has a negative rheumatoid factor with weakly positive CCP antibody of 24.\par On repeat labs her CCP is negative\par -Her current review of systems is positive for joint pains which is most likely secondary to osteoarthritis. 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 \par -Currently there is no indication for additional medication\par \par Positive CCP antibody-\par -her CCP antibodies weakly positive\par -She has features of osteoarthritis\par - on repeat labs her CCP is negative\par -At this time she does not have rheumatoid arthritis\par \par Unsteady gait-\par -recommend use of a cane\par -To resume physical therapy\par \par Followup 6 months. She is aware to call if her symptoms worsen\par

## 2019-09-27 NOTE — PHYSICAL EXAM
[General Appearance - Alert] : alert [General Appearance - Well Nourished] : well nourished [Sclera] : the sclera and conjunctiva were normal [Oropharynx] : the oropharynx was normal [General Appearance - Well Developed] : well developed [Neck Appearance] : the appearance of the neck was normal [Respiration, Rhythm And Depth] : normal respiratory rhythm and effort [Heart Sounds] : normal S1 and S2 [Auscultation Breath Sounds / Voice Sounds] : lungs were clear to auscultation bilaterally [Abdomen Tenderness] : non-tender [Full Pulse] : the pedal pulses are present [Edema] : there was no peripheral edema [Supraclavicular Lymph Nodes Enlarged Bilaterally] : supraclavicular [Cervical Lymph Nodes Enlarged Anterior Bilaterally] : anterior cervical [No Spinal Tenderness] : no spinal tenderness [Abnormal Walk] : normal gait [Motor Tone] : muscle strength and tone were normal [Nail Clubbing] : no clubbing  or cyanosis of the fingernails [] : no rash [Motor Exam] : the motor exam was normal [Oriented To Time, Place, And Person] : oriented to person, place, and time [Impaired Insight] : insight and judgment were intact [Affect] : the affect was normal [FreeTextEntry1] : b/l THR, hands with CMC OA, feet with Oa, + scoliosis, knees with crepitus

## 2019-09-27 NOTE — HISTORY OF PRESENT ILLNESS
[FreeTextEntry1] : 79 year old  female with a history of hypertension presents for further evaluation of a positive CANDIDA. The CANDIDA was drawn during a routine visit by her primary care physician when she was complaining of joint pains.\par \par I reviewed her labs, her CANDIDA is positive, she has a RNP antibody, her CCP antibody was weakly positive at 24 with a negative rheumatoid factor. She has normal sedimentation rate and CRP. \par on repeat labs with me her CCP is negative CANDIDA is negative and RNP is positive\par Since her last visit she has seen pul and did not need any additional intervention. \par \par She complains of pain in her hands knees and feet. She states that she has relief with Tylenol and Advil. Her granddaughter has spondyloarthropathy and she wants to be ruled out.\par \par She admits to long-standing history of Raynaud's phenomenon. She denies alopecia, oral ulcers, sicca symptoms. She denies muscle pain no muscle weakness. She admits to dysphagia to solids.\par \par She has a good appetite and denies weight loss. She denies fatigue. She denies fevers or chills or night sweats.

## 2019-09-27 NOTE — CONSULT LETTER
[Consult Letter:] : I had the pleasure of evaluating your patient, [unfilled]. [Dear  ___] : Dear  [unfilled], [Please see my note below.] : Please see my note below. [Consult Closing:] : Thank you very much for allowing me to participate in the care of this patient.  If you have any questions, please do not hesitate to contact me. [Sincerely,] : Sincerely, [FreeTextEntry3] : Janes Andre D.O\par

## 2019-12-09 ENCOUNTER — RX CHANGE (OUTPATIENT)
Age: 79
End: 2019-12-09

## 2019-12-09 RX ORDER — OXYBUTYNIN CHLORIDE 5 MG/1
5 TABLET, EXTENDED RELEASE ORAL
Qty: 90 | Refills: 3 | Status: ACTIVE | COMMUNITY
Start: 2019-08-22 | End: 1900-01-01

## 2020-03-24 ENCOUNTER — APPOINTMENT (OUTPATIENT)
Dept: UROLOGY | Facility: CLINIC | Age: 80
End: 2020-03-24

## 2020-03-27 ENCOUNTER — APPOINTMENT (OUTPATIENT)
Dept: RHEUMATOLOGY | Facility: CLINIC | Age: 80
End: 2020-03-27
Payer: MEDICARE

## 2020-03-27 PROCEDURE — G2012 BRIEF CHECK IN BY MD/QHP: CPT

## 2020-03-27 RX ORDER — CARVEDILOL 12.5 MG/1
12.5 TABLET, FILM COATED ORAL
Refills: 0 | Status: ACTIVE | COMMUNITY
Start: 2020-03-27

## 2020-03-27 RX ORDER — AMLODIPINE BESYLATE 5 MG/1
5 TABLET ORAL
Qty: 30 | Refills: 0 | Status: DISCONTINUED | COMMUNITY
Start: 2019-07-03 | End: 2020-03-27

## 2020-03-27 RX ORDER — NEBIVOLOL HYDROCHLORIDE 2.5 MG/1
2.5 TABLET ORAL
Qty: 30 | Refills: 0 | Status: DISCONTINUED | COMMUNITY
Start: 2019-07-29 | End: 2020-03-27

## 2020-06-30 ENCOUNTER — APPOINTMENT (OUTPATIENT)
Dept: UROLOGY | Facility: CLINIC | Age: 80
End: 2020-06-30
Payer: MEDICARE

## 2020-06-30 VITALS
WEIGHT: 130 LBS | TEMPERATURE: 97.9 F | BODY MASS INDEX: 23.92 KG/M2 | HEIGHT: 62 IN | OXYGEN SATURATION: 96 % | DIASTOLIC BLOOD PRESSURE: 78 MMHG | SYSTOLIC BLOOD PRESSURE: 135 MMHG | HEART RATE: 64 BPM

## 2020-06-30 PROCEDURE — 99213 OFFICE O/P EST LOW 20 MIN: CPT

## 2020-06-30 NOTE — PHYSICAL EXAM
[General Appearance - Well Developed] : well developed [General Appearance - Well Nourished] : well nourished [Normal Appearance] : normal appearance [Well Groomed] : well groomed [Abdomen Soft] : soft [General Appearance - In No Acute Distress] : no acute distress [Abdomen Tenderness] : non-tender [Urinary Bladder Findings] : the bladder was normal on palpation [Costovertebral Angle Tenderness] : no ~M costovertebral angle tenderness [Edema] : no peripheral edema [Respiration, Rhythm And Depth] : normal respiratory rhythm and effort [] : no respiratory distress [Oriented To Time, Place, And Person] : oriented to person, place, and time [Exaggerated Use Of Accessory Muscles For Inspiration] : no accessory muscle use [Affect] : the affect was normal [Mood] : the mood was normal [Not Anxious] : not anxious [No Focal Deficits] : no focal deficits [Normal Station and Gait] : the gait and station were normal for the patient's age [No Palpable Adenopathy] : no palpable adenopathy

## 2020-06-30 NOTE — REVIEW OF SYSTEMS
[Eyesight Problems] : eyesight problems [Constipation] : constipation [Date of last menstrual period ____] : date of last menstrual period: [unfilled] [Wake up at night to urinate  How many times?  ___] : wakes up to urinate [unfilled] times during the night [Strong urge to urinate] : strong urge to urinate [Difficulty Walking] : difficulty walking [see HPI] : see HPI [Feelings Of Weakness] : feelings of weakness [Muscle Weakness] : muscle weakness [Negative] : Heme/Lymph [FreeTextEntry6] : frequent urination 5-7

## 2020-06-30 NOTE — ASSESSMENT
[FreeTextEntry1] : 81 yo F with urgency frequency and nocturia. She reports some improvement on 5 mg oxybutynin ER, but also some possible anticholinergic side effects. We discussed increasing dose for better control vs trial of myrbetriq. Samples of myrbetriq given. If this improves symptoms, will Rx. If not, will consider increasing dose of oxybutynin.

## 2020-06-30 NOTE — HISTORY OF PRESENT ILLNESS
[FreeTextEntry1] : 78 year old woman with complaint of daytime frequency and nocturia 4-6x/night. This began months ago. \par It is mild in severity. Nothing makes the symptoms better, nothing makes sx worse. \par It is associated with constipation. She denies loud snoring, lower extremity edema, or orthopnea.\par No hematuria, no dysuria, no urgency, no hesitancy, no straining. No incontinence. \par No fevers, no chills, no nausea, no vomiting, no flank pain. No family history contributory to urinary frequency.\par \par 04/23/2019: Patient presents for follow up. She reports  symptoms and other medical  issues remain unchanged from above. She still complains of nocturia 4-6x/night. She performed voiding diary, but did not accurately record volumes, only times. She also reports new bilateral flank pain, associated with voiding. No hematuria, no dysuria, no urgency, no hesitancy, no straining. No incontinence. \par No fevers, no chills, no nausea, no vomiting, no flank pain. No family history contributory to urinary frequency. PVR 1 mL. \par \par 06/04/2019: Patient presents for follow up. She gives conflicting history, at first saying that her  symptoms are unchanged. Further questioning she reports that daytime frequency and urgency have improved, and nocturia is down to 2-3x/night. She also reports significnat constipation, with hard bowel movements every 3-4 days. No hematuria, no dysuria, no urgency, no hesitancy, no straining. No incontinence. No fevers, no chills, no nausea, no vomiting, no flank pain.\par Renal US negative\par \par 08/22/2019: Patient presents for follow up. She reports daytime frequency and urgency remain bothersome. She is taking oxybutynin IR QHS.  No hematuria, no dysuria, no urgency, no hesitancy, no straining. No incontinence. No fevers, no chills, no nausea, no vomiting, no flank pain.\par \par 09/24/2019: Patient presents for follow up. She reports daytime urgency improved, nocturia down to 4x/night from 7-8x/night on oxybutynin 5 mg ER. No hematuria, no dysuria, no hesitancy, no straining. No incontinence. No fevers, no chills, no nausea, no vomiting, no flank pain.\par \par 06/30/2020: Patient presents for follow up. She reports overall, urgency is better than previously, but still c/o urgnecy with daytime frequency every 2-3 hours and nocturia 4x/night. Does report constipation and mild dry mouth. No other  symptoms. No hematuria, no dysuria, no hesitancy, no straining. No incontinence. No fevers, no chills, no nausea, no vomiting, no flank pain.

## 2020-07-20 DIAGNOSIS — R35.1 NOCTURIA: ICD-10-CM

## 2020-11-04 ENCOUNTER — APPOINTMENT (OUTPATIENT)
Dept: NEUROLOGY | Facility: CLINIC | Age: 80
End: 2020-11-04
Payer: MEDICARE

## 2020-11-04 VITALS
SYSTOLIC BLOOD PRESSURE: 150 MMHG | HEART RATE: 87 BPM | HEIGHT: 62 IN | WEIGHT: 125 LBS | BODY MASS INDEX: 23 KG/M2 | DIASTOLIC BLOOD PRESSURE: 92 MMHG | TEMPERATURE: 97.8 F

## 2020-11-04 DIAGNOSIS — M96.1 POSTLAMINECTOMY SYNDROME, NOT ELSEWHERE CLASSIFIED: ICD-10-CM

## 2020-11-04 DIAGNOSIS — G45.9 TRANSIENT CEREBRAL ISCHEMIC ATTACK, UNSPECIFIED: ICD-10-CM

## 2020-11-04 DIAGNOSIS — R29.898 OTHER SYMPTOMS AND SIGNS INVOLVING THE MUSCULOSKELETAL SYSTEM: ICD-10-CM

## 2020-11-04 PROCEDURE — 99204 OFFICE O/P NEW MOD 45 MIN: CPT

## 2020-11-04 RX ORDER — ACETAMINOPHEN 325 MG/1
325 TABLET ORAL
Refills: 0 | Status: ACTIVE | COMMUNITY

## 2020-11-04 RX ORDER — OLMESARTAN MEDOXOMIL 5 MG/1
5 TABLET, FILM COATED ORAL
Refills: 0 | Status: ACTIVE | COMMUNITY
Start: 2020-03-27

## 2020-11-04 NOTE — DISCUSSION/SUMMARY
[FreeTextEntry1] : 80-year-old female with PMHx of HTN, HLD, lumbar DJD s/p fusion - failed back syndrome, chronic low back pains, had for an episode of sudden onset right leg weakness in April 2020, that has resulted on residual weakness and is unable to walk.\par \par # Failed back syndrome; chronic low back pain\par \par - Management with Dr. Thomson the pain management\par \par # Residual right leg weakness, this could be radicular in nature, I would like to rule out remote possibility of subacute CVA\par \par - I have recommended MRI of the brain\par - PT for low back and legs\par \par # CANDIDA positive, CCP antibody weakly positive.\par \par - Form of rheumatology, rule out RA

## 2020-11-04 NOTE — REASON FOR VISIT
[Consultation] : a consultation visit [Other: _____] : [unfilled] [FreeTextEntry1] : Referred by a Dr. James for evaluation of low back pain

## 2020-11-04 NOTE — PHYSICAL EXAM
[General Appearance - Alert] : alert [General Appearance - In No Acute Distress] : in no acute distress [General Appearance - Well-Appearing] : healthy appearing [Impaired Insight] : insight and judgment were intact [Mood] : the mood was normal [Person] : oriented to person [Place] : oriented to place [Time] : oriented to time [Registration Intact] : recent registration memory intact [Concentration Intact] : normal concentrating ability [Naming Objects] : no difficulty naming common objects [Repeating Phrases] : no difficulty repeating a phrase [Fluency] : fluency intact [Comprehension] : comprehension intact [Past History] : adequate knowledge of personal past history [Cranial Nerves Optic (II)] : visual acuity intact bilaterally,  visual fields full to confrontation, pupils equal round and reactive to light [Cranial Nerves Oculomotor (III)] : extraocular motion intact [Cranial Nerves Trigeminal (V)] : facial sensation intact symmetrically [Cranial Nerves Facial (VII)] : face symmetrical [Cranial Nerves Vestibulocochlear (VIII)] : hearing was intact bilaterally [Cranial Nerves Glossopharyngeal (IX)] : tongue and palate midline [Cranial Nerves Accessory (XI - Cranial And Spinal)] : head turning and shoulder shrug symmetric [Cranial Nerves Hypoglossal (XII)] : there was no tongue deviation with protrusion [No Muscle Atrophy] : normal bulk in all four extremities [Sensation Tactile Decrease] : light touch was intact [Proprioception] : proprioception was intact [Pain / Temp Decrease Lower Thigh (L3) - Right Only] : diminished right lower thigh (L3) [Pain / Temp Decrease Knee And Medial Leg (L4) - Right Only] : diminished right knee and medial leg (L4) [Pain / Temp Decrease Lateral Leg & Dorsum Of Foot Right Only] : diminished right lateral leg and dorsum of the foot (L5) [Vibration Decrease Leg / Foot Both Ankles] : decreased at both ankles [Vibration Decrease Leg / Foot Toes Both Feet] : decreased at the toes of both feet  [Non-ambulatory] : Non-ambulatory [Past-pointing] : there was no past-pointing [Tremor] : no tremor present [Coordination - Dysmetria Impaired Finger-to-Nose Bilateral] : not present [1+] : Brachioradialis left 1+ [0] : Ankle jerk left 0 [Plantar Reflex Right Only] : normal on the right [Plantar Reflex Left Only] : normal on the left [FreeTextEntry6] : No pronator drift, bilateral upper extremities 5/5, right lower extremity proximal muscles 4/5, left lower extremity proximal muscles 5-/5, dorsi-flexors 5/5\par  [PERRL With Normal Accommodation] : pupils were equal in size, round, reactive to light, with normal accommodation [Extraocular Movements] : extraocular movements were intact [No APD] : no afferent pupillary defect [Full Visual Field] : full visual field [Hearing Threshold Finger Rub Not [place]] : hearing was normal [] : the neck was supple [Neck Cervical Mass (___cm)] : no neck mass was observed [Heart Rate And Rhythm] : heart rate was normal and rhythm regular [Heart Sounds] : normal S1 and S2 [Arterial Pulses Carotid] : carotid pulses were normal with no bruits [Edema] : there was no peripheral edema [Involuntary Movements] : no involuntary movements were seen [FreeTextEntry1] : mildly hunched posture

## 2020-11-04 NOTE — REVIEW OF SYSTEMS
[As Noted in HPI] : as noted in HPI [Leg Weakness] : leg weakness [Poor Coordination] : poor coordination [Inability to Walk] : inability to walk [Constipation] : constipation [Negative] : Heme/Lymph [FreeTextEntry9] : muscle aches, cramps

## 2020-11-04 NOTE — DATA REVIEWED
[de-identified] : 3/4/20: MR lumbar spine: Stable grade 1 spondylolisthesis at 2 on L3, postsurgical changes from L4-S1, stable disc bulge/pseudo-bulge with mild foraminal changes versus disc herniation osteoarthritic changes of the facet joints, ligamentous hypertrophy producing central canal stenosis and right foraminal narrowing at L2-3. postsurgical scarring versus disc herniation and right foraminal narrowing at L3-4\par 11/3/20: B12 852, TFTs normal, LYME - NEG, AIC 5.3, CPK 33\par 8/14/19: ESR 26, RF < 10, CRP <0.10

## 2020-11-12 ENCOUNTER — NON-APPOINTMENT (OUTPATIENT)
Age: 80
End: 2020-11-12

## 2020-12-09 NOTE — BRIEF OPERATIVE NOTE - PRE-OP
Spoke with mom ,     Limit driving daylight only and familiar areas  Until treated <<-----Click on this checkbox to enter Pre-Op Dx

## 2021-01-27 ENCOUNTER — RX RENEWAL (OUTPATIENT)
Age: 81
End: 2021-01-27

## 2021-03-08 ENCOUNTER — APPOINTMENT (OUTPATIENT)
Dept: RHEUMATOLOGY | Facility: CLINIC | Age: 81
End: 2021-03-08
Payer: MEDICARE

## 2021-03-08 VITALS
HEIGHT: 62 IN | WEIGHT: 128 LBS | SYSTOLIC BLOOD PRESSURE: 100 MMHG | TEMPERATURE: 97 F | DIASTOLIC BLOOD PRESSURE: 68 MMHG | OXYGEN SATURATION: 96 % | BODY MASS INDEX: 23.55 KG/M2 | HEART RATE: 57 BPM

## 2021-03-08 DIAGNOSIS — R76.8 OTHER SPECIFIED ABNORMAL IMMUNOLOGICAL FINDINGS IN SERUM: ICD-10-CM

## 2021-03-08 DIAGNOSIS — M47.812 SPONDYLOSIS W/OUT MYELOPATHY OR RADICULOPATHY, CERVICAL REGION: ICD-10-CM

## 2021-03-08 DIAGNOSIS — M48.061 SPINAL STENOSIS, LUMBAR REGION WITHOUT NEUROGENIC CLAUDICATION: ICD-10-CM

## 2021-03-08 DIAGNOSIS — R26.81 UNSTEADINESS ON FEET: ICD-10-CM

## 2021-03-08 DIAGNOSIS — R79.89 OTHER SPECIFIED ABNORMAL FINDINGS OF BLOOD CHEMISTRY: ICD-10-CM

## 2021-03-08 PROCEDURE — 99214 OFFICE O/P EST MOD 30 MIN: CPT

## 2021-03-08 RX ORDER — GABAPENTIN 100 MG
100 TABLET ORAL
Refills: 0 | Status: DISCONTINUED | COMMUNITY
End: 2021-03-08

## 2021-03-08 RX ORDER — DULOXETINE HYDROCHLORIDE 30 MG/1
30 CAPSULE, DELAYED RELEASE PELLETS ORAL
Refills: 0 | Status: ACTIVE | COMMUNITY
Start: 2021-03-08

## 2021-03-09 PROBLEM — M47.812 DEGENERATIVE ARTHRITIS OF CERVICAL SPINE: Status: ACTIVE | Noted: 2021-03-09

## 2021-03-09 PROBLEM — R76.8 ANA POSITIVE: Status: ACTIVE | Noted: 2019-07-31

## 2021-03-09 PROBLEM — R26.81 UNSTEADY GAIT: Status: ACTIVE | Noted: 2019-07-31

## 2021-03-09 PROBLEM — M48.061 DEGENERATIVE LUMBAR SPINAL STENOSIS: Status: ACTIVE | Noted: 2021-03-09

## 2021-03-09 PROBLEM — R79.89 CYCLIC CITRULLINATED PEPTIDE (CCP) ANTIBODY POSITIVE: Status: ACTIVE | Noted: 2019-07-31

## 2021-03-09 NOTE — ASSESSMENT
[FreeTextEntry1] : 80-year-old  female with a history of hypertension and hip osteoarthritis status post bilateral hip replacements presents for further evaluation of a positive CANDIDA.\par \par Positive CANDIDA-\par -I reviewed her labs her CANDIDA is positive  with a RNP antibody. 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 \par -Currently there is no indication for additional medication\par \par Positive CCP antibody-\par -her CCP antibodies weakly positive\par -She has features of osteoarthritis\par - on repeat labs her CCP is negative\par -At this time she does not have rheumatoid arthritis\par \par Unsteady gait-\par - in a wheelchair today.\par -defers physical therapy\par \par Degenerative disc disease-\par Under care on neurology and orthopedics\par -She is on Cymbalta low-dose prefers not to try a higher dose\par -She is on Tylenol and not a candidate for NSAIDs\par -Recommend discussing with her primary care physician further, she is to decide whether she wants to pursue surgical option\par \par Followup prn. She is aware to call if her symptoms worsen\par

## 2021-03-09 NOTE — PHYSICAL EXAM
[General Appearance - Alert] : alert [General Appearance - Well Nourished] : well nourished [General Appearance - Well Developed] : well developed [Sclera] : the sclera and conjunctiva were normal [Oropharynx] : the oropharynx was normal [Neck Appearance] : the appearance of the neck was normal [Respiration, Rhythm And Depth] : normal respiratory rhythm and effort [Auscultation Breath Sounds / Voice Sounds] : lungs were clear to auscultation bilaterally [Heart Sounds] : normal S1 and S2 [Full Pulse] : the pedal pulses are present [Edema] : there was no peripheral edema [Abdomen Tenderness] : non-tender [Cervical Lymph Nodes Enlarged Anterior Bilaterally] : anterior cervical [Supraclavicular Lymph Nodes Enlarged Bilaterally] : supraclavicular [No Spinal Tenderness] : no spinal tenderness [Abnormal Walk] : normal gait [Nail Clubbing] : no clubbing  or cyanosis of the fingernails [Motor Tone] : muscle strength and tone were normal [FreeTextEntry1] : b/l THR, hands with CMC OA, feet with Oa, + scoliosis, knees with crepitus [] : no rash [Motor Exam] : the motor exam was normal [Oriented To Time, Place, And Person] : oriented to person, place, and time [Impaired Insight] : insight and judgment were intact [Affect] : the affect was normal

## 2021-03-09 NOTE — HISTORY OF PRESENT ILLNESS
[FreeTextEntry1] : f/u after 1 year\par 80 year old  female with a history of hypertension presents for further evaluation of a positive CANDIDA. The CANDIDA was drawn during a routine visit by her primary care physician when she was complaining of joint pains.\par \par I reviewed her labs, her CANDIDA is positive, she has a RNP antibody, her CCP antibody was weakly positive at 24 with a negative rheumatoid factor. She has normal sedimentation rate and CRP. \par on repeat labs with me her CCP is negative CANDIDA is negative and RNP is positive\par Since her last visit with me she has been seen orthopedics and spine specialist. The main issue is the pain in her lower back. It limits her from performing her activities of daily living. She has had epidurals with minimal relief. She saw a surgeon recently and they are recommending surgery. She's not sure what to do. She has tried gabapentin and could not tolerate it. She continues to use Tylenol. She is not a candidate for NSAIDs. Cymbalta 30 mg.\par \par She complains of pain in her hands knees and feet. She states that she has relief with Tylenol and Advil. \par \par She admits to long-standing history of Raynaud's phenomenon. She denies alopecia, oral ulcers, sicca symptoms. She denies muscle pain no muscle weakness. She admits to dysphagia to solids.\par \par She has a good appetite and denies weight loss. She denies fatigue. She denies fevers or chills or night sweats.

## 2021-03-23 ENCOUNTER — APPOINTMENT (OUTPATIENT)
Dept: UROLOGY | Facility: CLINIC | Age: 81
End: 2021-03-23
Payer: MEDICARE

## 2021-03-23 VITALS
TEMPERATURE: 97.2 F | BODY MASS INDEX: 24.48 KG/M2 | HEART RATE: 65 BPM | HEIGHT: 62 IN | OXYGEN SATURATION: 98 % | SYSTOLIC BLOOD PRESSURE: 91 MMHG | DIASTOLIC BLOOD PRESSURE: 54 MMHG | WEIGHT: 133 LBS

## 2021-03-23 DIAGNOSIS — R35.0 FREQUENCY OF MICTURITION: ICD-10-CM

## 2021-03-23 PROCEDURE — 99213 OFFICE O/P EST LOW 20 MIN: CPT

## 2021-03-24 NOTE — HISTORY OF PRESENT ILLNESS
[FreeTextEntry1] : 78 year old woman with complaint of daytime frequency and nocturia 4-6x/night. This began months ago. \par It is mild in severity. Nothing makes the symptoms better, nothing makes sx worse. \par It is associated with constipation. She denies loud snoring, lower extremity edema, or orthopnea.\par No hematuria, no dysuria, no urgency, no hesitancy, no straining. No incontinence. \par No fevers, no chills, no nausea, no vomiting, no flank pain. No family history contributory to urinary frequency.\par \par 04/23/2019: Patient presents for follow up. She reports  symptoms and other medical  issues remain unchanged from above. She still complains of nocturia 4-6x/night. She performed voiding diary, but did not accurately record volumes, only times. She also reports new bilateral flank pain, associated with voiding. No hematuria, no dysuria, no urgency, no hesitancy, no straining. No incontinence. \par No fevers, no chills, no nausea, no vomiting, no flank pain. No family history contributory to urinary frequency. PVR 1 mL. \par \par 06/04/2019: Patient presents for follow up. She gives conflicting history, at first saying that her  symptoms are unchanged. Further questioning she reports that daytime frequency and urgency have improved, and nocturia is down to 2-3x/night. She also reports significnat constipation, with hard bowel movements every 3-4 days. No hematuria, no dysuria, no urgency, no hesitancy, no straining. No incontinence. No fevers, no chills, no nausea, no vomiting, no flank pain.\par Renal US negative\par \par 08/22/2019: Patient presents for follow up. She reports daytime frequency and urgency remain bothersome. She is taking oxybutynin IR QHS.  No hematuria, no dysuria, no urgency, no hesitancy, no straining. No incontinence. No fevers, no chills, no nausea, no vomiting, no flank pain.\par \par 09/24/2019: Patient presents for follow up. She reports daytime urgency improved, nocturia down to 4x/night from 7-8x/night on oxybutynin 5 mg ER. No hematuria, no dysuria, no hesitancy, no straining. No incontinence. No fevers, no chills, no nausea, no vomiting, no flank pain.\par \par 06/30/2020: Patient presents for follow up. She reports overall, urgency is better than previously, but still c/o urgnecy with daytime frequency every 2-3 hours and nocturia 4x/night. Does report constipation and mild dry mouth. No other  symptoms. No hematuria, no dysuria, no hesitancy, no straining. No incontinence. No fevers, no chills, no nausea, no vomiting, no flank pain. \par \par 03/23/2021: Patient presents for follow up. She reports her overall frequency is better and voids every 3-4 hours overnight but continues to have some urge incontinence on Myrbetriq 50 mg QD. She reports some constipation and is trying OTC meds for it. No hematuria, no dysuria. No fevers, no chills, no nausea, no vomiting, no flank pain.

## 2021-03-24 NOTE — ASSESSMENT
[FreeTextEntry1] : 79 yo F with urgency frequency and nocturia. She reports some improvement on Myrbetriq 50 mg QHS and would like to continue it. Also discussed intra detrusor Botox injections for overactive bladder, risks/benefits/ alternatives, but patient would like to think about it. \par - UA/UCx\par - Continue Myrbetriq 50 mg QD\par - RTO in 6 months for symptoms check or sooner PRN

## 2021-03-26 LAB
APPEARANCE: CLEAR
BACTERIA: NEGATIVE
BILIRUBIN URINE: NEGATIVE
BLOOD URINE: NEGATIVE
COLOR: NORMAL
GLUCOSE QUALITATIVE U: NEGATIVE
HYALINE CASTS: 0 /LPF
KETONES URINE: NEGATIVE
LEUKOCYTE ESTERASE URINE: NEGATIVE
MICROSCOPIC-UA: NORMAL
NITRITE URINE: NEGATIVE
PH URINE: 7.5
PROTEIN URINE: NEGATIVE
RED BLOOD CELLS URINE: 1 /HPF
SPECIFIC GRAVITY URINE: 1.02
SQUAMOUS EPITHELIAL CELLS: 2 /HPF
UROBILINOGEN URINE: NORMAL
WHITE BLOOD CELLS URINE: 7 /HPF

## 2021-03-30 LAB — BACTERIA UR CULT: NORMAL

## 2021-04-08 NOTE — ASU PATIENT PROFILE, ADULT - PATIENT KNOW
[facility]    Progress Note    Sejal Lombardi Patient Status:  Inpatient    1942 MRN [id number]   Location [facility] 4W/SW/SE Attending Ha Phoenix MD   Hosp Day # 3 PCP Dodie Stephenson MD     Date of Admission:  2021 40 mEq, 40 mEq, Oral, Once  [COMPLETED] ibuprofen (MOTRIN) tab 600 mg, 600 mg, Oral, Once  [] sodium chloride 0.9% IV bolus 1,365 mL, 30 mL/kg (Ideal), Intravenous, Continuous  [COMPLETED] potassium chloride 40 mEq in sodium chloride 0.9% 250 mL IVP Medical History:   Diagnosis Date   • Anxiety state, unspecified    • Cataract    • Depression    • High blood pressure    • MGUS (monoclonal gammopathy of unknown significance)    • Ovarian ca [facility])    • Pernicious anemia    • Rectal cancer (HCC)    • Tra CA 6.6 (L) 04/08/2021    ALB 2.2 (L) 04/15/2020    ALKPHO 83 04/13/2020    TP 6.5 04/13/2020    AST 21 04/13/2020    ALT 34 04/13/2020    PTT 26.9 04/12/2020    INR 1.26 (H) 04/12/2020    PTP 15.7 (H) 04/12/2020    T4F 1.2 04/13/2020    TSH 5.080 (H) 04 yes

## 2021-05-27 NOTE — ASU PATIENT PROFILE, ADULT - FALLEN IN THE PAST
Problem: Altered Mood, Deterioration in Function:  Goal: Ability to perform activities of daily living will improve  Description: Ability to perform activities of daily living will improve  Outcome: Ongoing  Patient complaining of chronic pain to her mid back making it hard for her to walk around. As needed pain medication offered and accepted. Problem: Altered Mood, Deterioration in Function:  Goal: Able to verbalize reality based thinking  Description: Able to verbalize reality based thinking  5/26/2021 2348 by Scott Aguirre LPN  Outcome: Ongoing  Patient reports depressive symptoms continue and lack of energy. She denies any suicidal thoughts at this time. Problem: Tobacco Use:  Goal: Inpatient tobacco use cessation counseling participation  Description: Inpatient tobacco use cessation counseling participation  Outcome: Ongoing  Discussed with patient the benefits to quitting smoking and potential dangers of tobacco, verbalizes understanding. no

## 2021-07-06 ENCOUNTER — NON-APPOINTMENT (OUTPATIENT)
Age: 81
End: 2021-07-06

## 2021-08-05 ENCOUNTER — RX RENEWAL (OUTPATIENT)
Age: 81
End: 2021-08-05

## 2021-08-05 RX ORDER — MIRABEGRON 50 MG/1
50 TABLET, FILM COATED, EXTENDED RELEASE ORAL
Qty: 30 | Refills: 5 | Status: ACTIVE | COMMUNITY
Start: 2020-07-20 | End: 1900-01-01

## 2021-11-07 ENCOUNTER — EMERGENCY (EMERGENCY)
Facility: HOSPITAL | Age: 81
LOS: 0 days | Discharge: HOME | End: 2021-11-07
Attending: EMERGENCY MEDICINE
Payer: MEDICARE

## 2021-11-07 VITALS
HEIGHT: 63 IN | WEIGHT: 125 LBS | RESPIRATION RATE: 19 BRPM | TEMPERATURE: 99 F | DIASTOLIC BLOOD PRESSURE: 99 MMHG | HEART RATE: 67 BPM | OXYGEN SATURATION: 100 % | SYSTOLIC BLOOD PRESSURE: 206 MMHG

## 2021-11-07 VITALS
HEART RATE: 72 BPM | OXYGEN SATURATION: 99 % | SYSTOLIC BLOOD PRESSURE: 170 MMHG | DIASTOLIC BLOOD PRESSURE: 88 MMHG | RESPIRATION RATE: 18 BRPM | TEMPERATURE: 98 F
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
## 2021-11-07 DIAGNOSIS — Z82.69 FAMILY HISTORY OF OTHER DISEASES OF THE MUSCULOSKELETAL SYSTEM AND CONNECTIVE TISSUE: Chronic | ICD-10-CM

## 2021-11-07 DIAGNOSIS — M79.645 PAIN IN LEFT FINGER(S): ICD-10-CM

## 2021-11-07 DIAGNOSIS — Z98.1 ARTHRODESIS STATUS: Chronic | ICD-10-CM

## 2021-11-07 DIAGNOSIS — E78.00 PURE HYPERCHOLESTEROLEMIA, UNSPECIFIED: ICD-10-CM

## 2021-11-07 DIAGNOSIS — Z96.643 PRESENCE OF ARTIFICIAL HIP JOINT, BILATERAL: ICD-10-CM

## 2021-11-07 DIAGNOSIS — M79.644 PAIN IN RIGHT FINGER(S): ICD-10-CM

## 2021-11-07 DIAGNOSIS — S03.2XXA DISLOCATION OF TOOTH, INITIAL ENCOUNTER: ICD-10-CM

## 2021-11-07 DIAGNOSIS — Z87.19 PERSONAL HISTORY OF OTHER DISEASES OF THE DIGESTIVE SYSTEM: ICD-10-CM

## 2021-11-07 DIAGNOSIS — I10 ESSENTIAL (PRIMARY) HYPERTENSION: ICD-10-CM

## 2021-11-07 DIAGNOSIS — S01.511A LACERATION WITHOUT FOREIGN BODY OF LIP, INITIAL ENCOUNTER: ICD-10-CM

## 2021-11-07 DIAGNOSIS — I25.10 ATHEROSCLEROTIC HEART DISEASE OF NATIVE CORONARY ARTERY WITHOUT ANGINA PECTORIS: ICD-10-CM
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
## 2021-11-07 PROCEDURE — 73130 X-RAY EXAM OF HAND: CPT | Mod: 26,50

## 2021-11-07 PROCEDURE — 72125 CT NECK SPINE W/O DYE: CPT | Mod: MA

## 2021-11-07 PROCEDURE — 72125 CT NECK SPINE W/O DYE: CPT | Mod: 26,MA

## 2021-11-07 PROCEDURE — 99284 EMERGENCY DEPT VISIT MOD MDM: CPT

## 2021-11-07 PROCEDURE — 99284 EMERGENCY DEPT VISIT MOD MDM: CPT | Mod: 25

## 2021-11-07 PROCEDURE — 70486 CT MAXILLOFACIAL W/O DYE: CPT | Mod: MA

## 2021-11-07 PROCEDURE — 70450 CT HEAD/BRAIN W/O DYE: CPT | Mod: MA

## 2021-11-07 PROCEDURE — 76376 3D RENDER W/INTRP POSTPROCES: CPT

## 2021-11-07 PROCEDURE — 73130 X-RAY EXAM OF HAND: CPT | Mod: 50

## 2021-11-07 PROCEDURE — 70486 CT MAXILLOFACIAL W/O DYE: CPT | Mod: 26,MA

## 2021-11-07 PROCEDURE — 76376 3D RENDER W/INTRP POSTPROCES: CPT | Mod: 26

## 2021-11-07 PROCEDURE — 70450 CT HEAD/BRAIN W/O DYE: CPT | Mod: 26,MA

## 2021-11-07 RX ORDER — TRAMADOL HYDROCHLORIDE 50 MG/1
25 TABLET ORAL ONCE
Refills: 0 | Status: DISCONTINUED | OUTPATIENT
Start: 2021-11-07 | End: 2021-11-07

## 2021-11-07 RX ADMIN — Medication 100 MILLIGRAM(S): at 22:25

## 2021-11-07 NOTE — ED PROVIDER NOTE - OBJECTIVE STATEMENT
81 year old female with PMHx of CAD, cardiac murmur, GERD, HTN, mitral regurgitation, osteopenia, scoliosis, hypercholesteremia presents to the ED s/p trip and fall in kitchen with significant laceration to upper lip. Pt also c/o headaches. Pt reports losing tooth during fall. Denies LOC or dizziness. Denies anticoagulants 81 year old female with PMHx of CAD, cardiac murmur, GERD, HTN, mitral regurgitation, osteopenia, scoliosis, hypercholesteremia presents to the ED s/p trip and fall in kitchen with significant laceration to upper lip. Pt reports that she felt unsteady prior to fall, but states that she has history of these episodes of unsteadiness. Pt also c/o bilateral 5th digit knuckle pain. Pt reports losing tooth during fall. Denies LOC or dizziness. Denies anticoagulants  PMD: Dr. James

## 2021-11-07 NOTE — ED ADULT TRIAGE NOTE - PRO INTERPRETER NEED 2
Pt states he has has multiple episodes of diarrhea since this past Saturday- states he felt feverish and nauseated this Saturday but now both have passed, still having diarrhea.
English

## 2021-11-07 NOTE — ED PROVIDER NOTE - CARE PROVIDER_API CALL
Lucian Goetz (DDS)  Surgery  [address] [place] [address]  Phone: [phone]  Fax: [phone]  Follow Up Time: Urgent

## 2021-11-07 NOTE — ED ADULT NURSE NOTE - NSIMPLEMENTINTERV_GEN_ALL_ED
Implemented All Fall Risk Interventions:  [place] to call system. Call bell, personal items and telephone within reach. Instruct patient to call for assistance. Room bathroom lighting operational. Non-slip footwear when patient is off stretcher. Physically safe environment: no spills, clutter or unnecessary equipment. Stretcher in lowest position, wheels locked, appropriate side rails in place. Provide visual cue, wrist band, yellow gown, etc. Monitor gait and stability. Monitor for mental status changes and reorient to person, place, and time. Review medications for side effects contributing to fall risk. Reinforce activity limits and safety measures with patient and family.

## 2021-11-07 NOTE — ED PROVIDER NOTE - PATIENT PORTAL LINK FT
You can access the FollowMyHealth Patient Portal offered by [facility] by registering at the following website: [email or web address]. By joining [facility]’s FollowMyHealth portal, you will also be able to view your health information using other applications (apps) compatible with our system.

## 2021-11-07 NOTE — ED PROVIDER NOTE - NSFOLLOWUPINSTRUCTIONS_ED_ALL_ED_FT
Please follow up emergently tomorrow for further evaluation of your dental avulsion.  You can call the office of Dr. Goetz at [phone].     Laceration    WHAT YOU NEED TO KNOW:    A laceration is an injury to the skin and the soft tissue underneath it. Lacerations happen when you are cut or hit by something. They can happen anywhere on the body.     DISCHARGE INSTRUCTIONS:    Return to the emergency department if:     You have heavy bleeding or bleeding that does not stop after 10 minutes of holding firm, direct pressure over the wound.       Your wound opens up.     Contact your healthcare provider if:     You have a fever or chills.       Your laceration is red, warm, or swollen.      You have red streaks on your skin coming from your wound.      You have white or yellow drainage from the wound that smells bad.      You have pain that gets worse, even after treatment.       You have questions or concerns about your condition or care.     Medicines:     Prescription pain medicine may be given. Ask how to take this medicine safely.       Antibiotics help treat or prevent a bacterial infection.       Take your medicine as directed. Contact your healthcare provider if you think your medicine is not helping or if you have side effects. Tell him or her if you are allergic to any medicine. Keep a list of the medicines, vitamins, and herbs you take. Include the amounts, and when and why you take them. Bring the list or the pill bottles to follow-up visits. Carry your medicine list with you in case of an emergency.    Care for your wound as directed:     Do not get your wound wet until your healthcare provider says it is okay. Do not soak your wound in water. Do not go swimming until your healthcare provider says it is okay. Carefully wash the wound with soap and water. Gently pat the area dry or allow it to air dry.       Change your bandages when they get wet, dirty, or after washing. Apply new, clean bandages as directed. Do not apply elastic bandages or tape too tight. Do not put powders or lotions over your incision.       Apply antibiotic ointment as directed. Your healthcare provider may give you antibiotic ointment to put over your wound if you have stitches. If you have strips of tape over your incision, let them dry up and fall off on their own. If they do not fall off within 14 days, gently remove them. If you have glue over your wound, do not remove or pick at it. If your glue comes off, do not replace it with glue that you have at home.       Check your wound every day for signs of infection such as swelling, redness, or pus.     Self-care:     Apply ice on your wound for 15 to 20 minutes every hour or as directed. Use an ice pack, or put crushed ice in a plastic bag. Cover it with a towel. Ice helps prevent tissue damage and decreases swelling and pain.      Use a splint as directed. A splint will decrease movement and stress on your wound. It may help it heal faster. A splint may be used for lacerations over joints or areas of your body that bend. Ask your healthcare provider how to apply and remove a splint.       Decrease scarring of your wound by applying ointments as directed. Do not apply ointments until your healthcare provider says it is okay. You may need to wait until your wound is healed. Ask which ointment to buy and how often to use it. After your wound is healed, use sunscreen over the area when you are out in the sun. You should do this for at least 6 months to 1 year after your injury.     Follow up with your healthcare provider as directed: You may need to follow up in 24 to 48 hours to have your wound checked for infection. You will need to return in 3 to 14 days if you have stitches or staples so they can be removed. Care for your wound as directed to prevent infection and help it heal. Write down your questions so you remember to ask them during your visits.

## 2021-11-07 NOTE — ED PROVIDER NOTE - SKIN, MLM
Skin normal color for race, warm, dry and intact. No evidence of rash. Missing right front tooth, laceration extending from lip margin to upper lip. No evidence of rash.

## 2021-11-07 NOTE — ED PROVIDER NOTE - NSICDXPASTMEDICALHX_GEN_ALL_CORE_FT
PAST MEDICAL HISTORY:  CAD (coronary artery disease)     Cardiac murmur     GERD (gastroesophageal reflux disease)     HTN (hypertension)     Hypercholesteremia     Mitral regurgitation     Osteopenia     Scoliosis

## 2021-11-07 NOTE — CONSULT NOTE ADULT - SUBJECTIVE AND OBJECTIVE BOX
ANNETTE LOPEZ  [id number]      81y y/o presents to the ER with laceration after mechanical fall at her son's home. Patient and daughter denies LOC, changes in vision, changes in teeth alignment, nausea or emesis.  Patient denies other injuries.    No pertinent family history in first degree relatives    MEWS Score    HTN (hypertension)    Scoliosis    Hypercholesteremia    GERD (gastroesophageal reflux disease)    Cardiac murmur    CAD (coronary artery disease)    Mitral regurgitation    Osteopenia    H/O spinal fusion    FH: bilateral hip replacements    FALL    90+    SysAdmin_VisitLink        No Known Allergies      T(C): 37 (11-07-21 @ 19:14), Max: 37 (11-07-21 @ 19:14)  HR: 67 (11-07-21 @ 19:14) (67 - 67)  BP: 206/99 (11-07-21 @ 19:14) (206/99 - 206/99)  RR: 19 (11-07-21 @ 19:14) (19 - 19)  SpO2: 100% (11-07-21 @ 19:14) (100% - 100%)    NAD  HEENT:  EOMi.  PERRLA.  No facial tenderness.  Intranasal: No injuries.  Intraoral: Maxillary central incisor dislodged.  NO loose dentures.  Laceration: 3cm in upper lip deep to subcutaneous layer with necrotic tissue.  CN2-12 intact.            Procedure:  Right and left infraorbital nerve blocks.  Washout of wound with betadine.  Excisional debridement skin including subcutaneous layers.  Skin flaps widely undermined, back cuts created at white roll, rotated and advanced, recipient site undermined, repaired with 5.0 vicryl/5.0 chromic.  Bacitracin applied.    A/P: 81y y.o with laceration s/p repair.  - Head elevation  - Tylenol pain prn  - Tetanus  - Soft diet x 2 days  - Abx  - Bacitracin BID  - F/U 5 days  - Patient and family educated on warning signs to prompt ER return pending ER discharge      Thank You  Rufus Abarca MD  Plastic Surgery

## 2021-11-07 NOTE — ED ADULT TRIAGE NOTE - CHIEF COMPLAINT QUOTE
Pt reports trip and fall in kitchen with significant laceration to upper lip. Pt reports losing tooth during fall. Denies LOC or dizziness. Reports headache. Denies anticoagulants.

## 2021-11-07 NOTE — ED PROVIDER NOTE - CLINICAL SUMMARY MEDICAL DECISION MAKING FREE TEXT BOX
CT head, CT cspine, CT maxillofacial, Will discuss with plastic and OMFS. Will try to reimplant tooth if possible. CT head, CT cspine, CT maxillofacial, Will discuss with plastics and OMFS. Will try to reimplant tooth if possible.

## 2021-11-07 NOTE — ED PROVIDER NOTE - PROGRESS NOTE DETAILS
OMFS, Dr. Goetz, reportedly does not have privileges in hospital.  I discussed with family that the longer tooth remains out the more likely that tooth will be unable to be implanted.  Family brought the tooth to ED soaking in milk.  I gently cleansed with normal saline and reimplanted.  Discussed dental bridge with wire of mask with dermabond as we do not have access to dental kit, but pt and family declines.  Tooth is actually quite firmly in place without bridge.  Already has f/u tomorrow morning with dentist.  Lip laceration repaired by Dr. Abarca.  Pt well appearing.  D/c home with strict return precautions and prompt outpatient f/u.

## 2021-11-07 NOTE — ED PROVIDER NOTE - MUSCULOSKELETAL, MLM
Spine appears normal, range of motion is not limited, no muscle or joint tenderness Spine appears normal, range of motion is not limited. Bilateral 5th metacarpal pain, nvi.

## 2021-11-07 NOTE — ED ADULT NURSE NOTE - OBJECTIVE STATEMENT
Pt ambulatory to ED for fall. Pt states mechanical fall in kitchen with positive head strike. Upon assessment laceration noted to upper lip and loss of frontal tooth. Denies LOC or blood thinners. No obvious signs of hemorrhage. No acute distress noted at this time.

## 2022-04-05 NOTE — ED ADULT NURSE NOTE - NSSUHOSCREENINGYN_ED_ALL_ED
ALLERGIES:   Allergen Reactions   • Cayenne Pepper [Cayenne   (Food Or Med)] SHORTNESS OF BREATH     itchiness   • Food (Food Or Med) Other (See Comments)     Juana orange soda  Itchy   • Glutamine Multivitamin-Mineral Po Other (See Comments)     Juana orange soda   • Levonorgestrel-Ethinyl Estrad Other (See Comments)     Watery eyes, sneezing, congestion   • Nickel RASH   • Penicillins      Probably allergic    • Seasonal Other (See Comments)     Watery eyes, sneezing, congestion   • Unknown Other (See Comments)     Cajun food-dizziness, hands swell  Environmental-congestion  Orange Soda-Burning     Medications reviewed with patient.  Tobacco use verified.  Primary medical doctor: Gonzalo Verma MD    Chief Complaint   Patient presents with   • Hip     follow up , review mri of the left hip       Do you have any other health questions or concerns?  NO   Yes - the patient is able to be screened

## 2022-12-21 NOTE — ED ADULT NURSE NOTE - NSICDXFAMILYHX_GEN_ALL_CORE_FT
Signed Prescriptions:                        Disp   Refills    amoxicillin (AMOXIL) 500 MG capsule        14 cap*0        Sig: Take 1 capsule (500 mg) by mouth 2 times daily  Authorizing Provider: JESSE MATAMOROS YAYO  Ordering User: JENN BAILEY    Order discussed with nurse Felecia.     Jenn Bailey, MSN RN      
Signed Prescriptions:                        Disp   Refills    ciprofloxacin (CIPRO) 500 MG tablet        10 tab*0        Sig: Take 1 tablet (500 mg) by mouth 2 times daily  Authorizing Provider: JESSE MATAMOROS  Ordering User: ANDREA BAILEY      
FAMILY HISTORY:  No pertinent family history in first degree relatives

## 2023-03-15 NOTE — ED ADULT NURSE NOTE - TEMPLATE
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     Patient reports she feels uncomfortable going to school and does not feel like going. She reports feeling unmotivated to do schoolwork. She reports she was late to the calixto's class on Friday and was called to the office yesterday and she learned that this was an unexcused absence. Because this was the 3rd unexcused absence she was to have care home after school today. She reports she did not mind going to care home, but she felt embarrassed to go to school after this. She reports she still enjoys things she typically does and is motivated to spend time with friends on the weekends. She reports feeling down/depressed in the morning and when she gets home from school. She feels tired during school. She reports feeling depressed most days of the week. She denies suicidal ideation, intent, or plan. She reports good sleep and appetite. She reports feeling anxious/nervous often. She denies panic attacks. Patient denies manic symptoms including elevated mood, increased irritability, mood lability, distractibility, grandiosity, pressured speech, increase in goal-directed activity, or decreased need for sleep. Patient denies any psychotic symptoms including paranoia, ideas of reference, thought insertion/broadcasting, or auditory/visual/olfactory/tactile/gustatory hallucinations.    Mother reports the last couple of months have been "unbearable" with her. She has been refusing to go to school a lot this school year. Mother reports safety concerns in light of older daughter's suicide attempts.

## 2023-09-02 ENCOUNTER — INPATIENT (INPATIENT)
Facility: HOSPITAL | Age: 83
LOS: 3 days | Discharge: NURSING, REHAB OR LONG-TERM CARE | DRG: 177 | End: 2023-09-06
Attending: STUDENT IN AN ORGANIZED HEALTH CARE EDUCATION/TRAINING PROGRAM | Admitting: INTERNAL MEDICINE
Payer: MEDICARE

## 2023-09-02 VITALS
OXYGEN SATURATION: 100 % | SYSTOLIC BLOOD PRESSURE: 186 MMHG | HEART RATE: 90 BPM | DIASTOLIC BLOOD PRESSURE: 98 MMHG | TEMPERATURE: 101 F | RESPIRATION RATE: 18 BRPM | HEIGHT: 63 IN | WEIGHT: 130.07 LBS

## 2023-09-02 DIAGNOSIS — Z98.1 ARTHRODESIS STATUS: Chronic | ICD-10-CM

## 2023-09-02 DIAGNOSIS — Z82.69 FAMILY HISTORY OF OTHER DISEASES OF THE MUSCULOSKELETAL SYSTEM AND CONNECTIVE TISSUE: Chronic | ICD-10-CM

## 2023-09-02 LAB
ALBUMIN SERPL ELPH-MCNC: 3.6 G/DL — SIGNIFICANT CHANGE UP (ref 3.3–5)
ALP SERPL-CCNC: 55 U/L — SIGNIFICANT CHANGE UP (ref 40–120)
ALT FLD-CCNC: 25 U/L — SIGNIFICANT CHANGE UP (ref 12–78)
ANION GAP SERPL CALC-SCNC: 7 MMOL/L — SIGNIFICANT CHANGE UP (ref 5–17)
APTT BLD: 26 SEC — SIGNIFICANT CHANGE UP (ref 24.5–35.6)
AST SERPL-CCNC: 27 U/L — SIGNIFICANT CHANGE UP (ref 15–37)
BASOPHILS # BLD AUTO: 0.02 K/UL — SIGNIFICANT CHANGE UP (ref 0–0.2)
BASOPHILS NFR BLD AUTO: 0.4 % — SIGNIFICANT CHANGE UP (ref 0–2)
BILIRUB SERPL-MCNC: 0.5 MG/DL — SIGNIFICANT CHANGE UP (ref 0.2–1.2)
BUN SERPL-MCNC: 25 MG/DL — HIGH (ref 7–23)
CALCIUM SERPL-MCNC: 9.4 MG/DL — SIGNIFICANT CHANGE UP (ref 8.5–10.1)
CHLORIDE SERPL-SCNC: 112 MMOL/L — HIGH (ref 96–108)
CO2 SERPL-SCNC: 22 MMOL/L — SIGNIFICANT CHANGE UP (ref 22–31)
CREAT SERPL-MCNC: 1.33 MG/DL — HIGH (ref 0.5–1.3)
EGFR: 39 ML/MIN/1.73M2 — LOW
ELLIPTOCYTES BLD QL SMEAR: SLIGHT — SIGNIFICANT CHANGE UP
EOSINOPHIL # BLD AUTO: 0.02 K/UL — SIGNIFICANT CHANGE UP (ref 0–0.5)
EOSINOPHIL NFR BLD AUTO: 0.4 % — SIGNIFICANT CHANGE UP (ref 0–6)
GLUCOSE SERPL-MCNC: 113 MG/DL — HIGH (ref 70–99)
HCT VFR BLD CALC: 38.5 % — SIGNIFICANT CHANGE UP (ref 34.5–45)
HGB BLD-MCNC: 13 G/DL — SIGNIFICANT CHANGE UP (ref 11.5–15.5)
IMM GRANULOCYTES NFR BLD AUTO: 0.2 % — SIGNIFICANT CHANGE UP (ref 0–0.9)
INR BLD: 1.03 RATIO — SIGNIFICANT CHANGE UP (ref 0.85–1.18)
LACTATE SERPL-SCNC: 1.1 MMOL/L — SIGNIFICANT CHANGE UP (ref 0.7–2)
LG PLATELETS BLD QL AUTO: SLIGHT — SIGNIFICANT CHANGE UP
LYMPHOCYTES # BLD AUTO: 0.97 K/UL — LOW (ref 1–3.3)
LYMPHOCYTES # BLD AUTO: 21.6 % — SIGNIFICANT CHANGE UP (ref 13–44)
MANUAL SMEAR VERIFICATION: YES — SIGNIFICANT CHANGE UP
MCHC RBC-ENTMCNC: 29 PG — SIGNIFICANT CHANGE UP (ref 27–34)
MCHC RBC-ENTMCNC: 33.8 GM/DL — SIGNIFICANT CHANGE UP (ref 32–36)
MCV RBC AUTO: 85.9 FL — SIGNIFICANT CHANGE UP (ref 80–100)
MONOCYTES # BLD AUTO: 0.92 K/UL — HIGH (ref 0–0.9)
MONOCYTES NFR BLD AUTO: 20.4 % — HIGH (ref 2–14)
NEUTROPHILS # BLD AUTO: 2.56 K/UL — SIGNIFICANT CHANGE UP (ref 1.8–7.4)
NEUTROPHILS NFR BLD AUTO: 57 % — SIGNIFICANT CHANGE UP (ref 43–77)
PLAT MORPH BLD: NORMAL — SIGNIFICANT CHANGE UP
PLATELET # BLD AUTO: 182 K/UL — SIGNIFICANT CHANGE UP (ref 150–400)
POTASSIUM SERPL-MCNC: 4.3 MMOL/L — SIGNIFICANT CHANGE UP (ref 3.5–5.3)
POTASSIUM SERPL-SCNC: 4.3 MMOL/L — SIGNIFICANT CHANGE UP (ref 3.5–5.3)
PROT SERPL-MCNC: 7 GM/DL — SIGNIFICANT CHANGE UP (ref 6–8.3)
PROTHROM AB SERPL-ACNC: 11.6 SEC — SIGNIFICANT CHANGE UP (ref 9.5–13)
RBC # BLD: 4.48 M/UL — SIGNIFICANT CHANGE UP (ref 3.8–5.2)
RBC # FLD: 13.2 % — SIGNIFICANT CHANGE UP (ref 10.3–14.5)
RBC BLD AUTO: ABNORMAL
SODIUM SERPL-SCNC: 141 MMOL/L — SIGNIFICANT CHANGE UP (ref 135–145)
SPHEROCYTES BLD QL SMEAR: SLIGHT — SIGNIFICANT CHANGE UP
WBC # BLD: 4.5 K/UL — SIGNIFICANT CHANGE UP (ref 3.8–10.5)
WBC # FLD AUTO: 4.5 K/UL — SIGNIFICANT CHANGE UP (ref 3.8–10.5)

## 2023-09-02 PROCEDURE — 71045 X-RAY EXAM CHEST 1 VIEW: CPT | Mod: 26

## 2023-09-02 PROCEDURE — 99285 EMERGENCY DEPT VISIT HI MDM: CPT

## 2023-09-02 PROCEDURE — 93010 ELECTROCARDIOGRAM REPORT: CPT

## 2023-09-02 PROCEDURE — 70450 CT HEAD/BRAIN W/O DYE: CPT | Mod: 26,MA

## 2023-09-02 RX ORDER — ACETAMINOPHEN 500 MG
1000 TABLET ORAL ONCE
Refills: 0 | Status: COMPLETED | OUTPATIENT
Start: 2023-09-02 | End: 2023-09-02

## 2023-09-02 RX ORDER — ACETAMINOPHEN 500 MG
650 TABLET ORAL ONCE
Refills: 0 | Status: DISCONTINUED | OUTPATIENT
Start: 2023-09-02 | End: 2023-09-02

## 2023-09-02 RX ADMIN — Medication 400 MILLIGRAM(S): at 23:28

## 2023-09-02 NOTE — ED PROVIDER NOTE - CARE PLAN
1 Principal Discharge DX:	2019 novel coronavirus disease (COVID-19)  Secondary Diagnosis:	KEN (acute kidney injury)  Secondary Diagnosis:	Metabolic encephalopathy

## 2023-09-02 NOTE — ED PROVIDER NOTE - CLINICAL SUMMARY MEDICAL DECISION MAKING FREE TEXT BOX
Patient with COVID positive, no hypoxia.  Labs with KEN.  Given IVF.  Patient very weak, frail, mild confusion likely metabolic encephalopathy, requiring admission to hospital at this time.

## 2023-09-02 NOTE — ED ADULT NURSE NOTE - OBJECTIVE STATEMENT
Pt presents to ED c/o weakness. Pt w/ weakness, headache and temperature, tmax 100.5 in ED. Pt states bf has covid. Pt denies SOB, n.v, cough, pain. Airway patent.

## 2023-09-02 NOTE — ED ADULT NURSE NOTE - NSFALLHARMRISKINTERV_ED_ALL_ED

## 2023-09-02 NOTE — ED PROVIDER NOTE - OBJECTIVE STATEMENT
82 y/o female with a PMHx of CAD, cardiac murmur, GERD, HTN, mitral regurgitation, osteopenia, scoliosis, hypercholesteremia presents to the ED c/o generalized weakness, decreased PO intake, fever Tmax 100.5 in ED. Endorses headache, sick contact of boyfriend who has covid 4 days ago. Denies chest pain, sob, nausea, vomiting, cough.

## 2023-09-02 NOTE — ED ADULT TRIAGE NOTE - CHIEF COMPLAINT QUOTE
generalized weakness with decreased po intake. Boyfriend has covid. + headache. Denies chest pain, sob, nausea, vomiting.

## 2023-09-03 DIAGNOSIS — U07.1 COVID-19: ICD-10-CM

## 2023-09-03 LAB
ALBUMIN SERPL ELPH-MCNC: 3.2 G/DL — LOW (ref 3.3–5)
ALP SERPL-CCNC: 48 U/L — SIGNIFICANT CHANGE UP (ref 40–120)
ALT FLD-CCNC: 21 U/L — SIGNIFICANT CHANGE UP (ref 12–78)
AST SERPL-CCNC: 23 U/L — SIGNIFICANT CHANGE UP (ref 15–37)
BILIRUB DIRECT SERPL-MCNC: 0.2 MG/DL — SIGNIFICANT CHANGE UP (ref 0–0.3)
BILIRUB INDIRECT FLD-MCNC: 0.3 MG/DL — SIGNIFICANT CHANGE UP (ref 0.2–1)
BILIRUB SERPL-MCNC: 0.5 MG/DL — SIGNIFICANT CHANGE UP (ref 0.2–1.2)
CREAT SERPL-MCNC: 0.93 MG/DL — SIGNIFICANT CHANGE UP (ref 0.5–1.3)
EGFR: 60 ML/MIN/1.73M2 — SIGNIFICANT CHANGE UP
INR BLD: 1.01 RATIO — SIGNIFICANT CHANGE UP (ref 0.85–1.18)
PROT SERPL-MCNC: 6.2 GM/DL — SIGNIFICANT CHANGE UP (ref 6–8.3)
PROTHROM AB SERPL-ACNC: 11.4 SEC — SIGNIFICANT CHANGE UP (ref 9.5–13)
RAPID RVP RESULT: DETECTED
SARS-COV-2 RNA SPEC QL NAA+PROBE: DETECTED

## 2023-09-03 PROCEDURE — 80053 COMPREHEN METABOLIC PANEL: CPT

## 2023-09-03 PROCEDURE — 85379 FIBRIN DEGRADATION QUANT: CPT

## 2023-09-03 PROCEDURE — 97535 SELF CARE MNGMENT TRAINING: CPT | Mod: GP

## 2023-09-03 PROCEDURE — 97116 GAIT TRAINING THERAPY: CPT | Mod: GP

## 2023-09-03 PROCEDURE — 80076 HEPATIC FUNCTION PANEL: CPT

## 2023-09-03 PROCEDURE — 85027 COMPLETE CBC AUTOMATED: CPT

## 2023-09-03 PROCEDURE — 99223 1ST HOSP IP/OBS HIGH 75: CPT

## 2023-09-03 PROCEDURE — 36415 COLL VENOUS BLD VENIPUNCTURE: CPT

## 2023-09-03 PROCEDURE — 85610 PROTHROMBIN TIME: CPT

## 2023-09-03 PROCEDURE — 97162 PT EVAL MOD COMPLEX 30 MIN: CPT | Mod: GP

## 2023-09-03 PROCEDURE — 80048 BASIC METABOLIC PNL TOTAL CA: CPT

## 2023-09-03 PROCEDURE — 82565 ASSAY OF CREATININE: CPT

## 2023-09-03 RX ORDER — DEXAMETHASONE 0.5 MG/5ML
6 ELIXIR ORAL DAILY
Refills: 0 | Status: DISCONTINUED | OUTPATIENT
Start: 2023-09-03 | End: 2023-09-06

## 2023-09-03 RX ORDER — SIMVASTATIN 20 MG/1
10 TABLET, FILM COATED ORAL AT BEDTIME
Refills: 0 | Status: DISCONTINUED | OUTPATIENT
Start: 2023-09-03 | End: 2023-09-06

## 2023-09-03 RX ORDER — ONDANSETRON 8 MG/1
4 TABLET, FILM COATED ORAL EVERY 8 HOURS
Refills: 0 | Status: DISCONTINUED | OUTPATIENT
Start: 2023-09-03 | End: 2023-09-06

## 2023-09-03 RX ORDER — ENOXAPARIN SODIUM 100 MG/ML
30 INJECTION SUBCUTANEOUS EVERY 24 HOURS
Refills: 0 | Status: DISCONTINUED | OUTPATIENT
Start: 2023-09-03 | End: 2023-09-06

## 2023-09-03 RX ORDER — REMDESIVIR 5 MG/ML
100 INJECTION INTRAVENOUS EVERY 24 HOURS
Refills: 0 | Status: DISCONTINUED | OUTPATIENT
Start: 2023-09-04 | End: 2023-09-06

## 2023-09-03 RX ORDER — REMDESIVIR 5 MG/ML
INJECTION INTRAVENOUS
Refills: 0 | Status: DISCONTINUED | OUTPATIENT
Start: 2023-09-03 | End: 2023-09-06

## 2023-09-03 RX ORDER — LOSARTAN POTASSIUM 100 MG/1
25 TABLET, FILM COATED ORAL DAILY
Refills: 0 | Status: DISCONTINUED | OUTPATIENT
Start: 2023-09-03 | End: 2023-09-06

## 2023-09-03 RX ORDER — SODIUM CHLORIDE 9 MG/ML
1000 INJECTION INTRAMUSCULAR; INTRAVENOUS; SUBCUTANEOUS ONCE
Refills: 0 | Status: COMPLETED | OUTPATIENT
Start: 2023-09-03 | End: 2023-09-03

## 2023-09-03 RX ORDER — LANOLIN ALCOHOL/MO/W.PET/CERES
3 CREAM (GRAM) TOPICAL AT BEDTIME
Refills: 0 | Status: DISCONTINUED | OUTPATIENT
Start: 2023-09-03 | End: 2023-09-06

## 2023-09-03 RX ORDER — ACETAMINOPHEN 500 MG
650 TABLET ORAL EVERY 6 HOURS
Refills: 0 | Status: DISCONTINUED | OUTPATIENT
Start: 2023-09-03 | End: 2023-09-06

## 2023-09-03 RX ORDER — REMDESIVIR 5 MG/ML
200 INJECTION INTRAVENOUS EVERY 24 HOURS
Refills: 0 | Status: COMPLETED | OUTPATIENT
Start: 2023-09-03 | End: 2023-09-03

## 2023-09-03 RX ORDER — HYDRALAZINE HCL 50 MG
10 TABLET ORAL EVERY 6 HOURS
Refills: 0 | Status: DISCONTINUED | OUTPATIENT
Start: 2023-09-03 | End: 2023-09-03

## 2023-09-03 RX ADMIN — Medication 10 MILLIGRAM(S): at 03:45

## 2023-09-03 RX ADMIN — REMDESIVIR 200 MILLIGRAM(S): 5 INJECTION INTRAVENOUS at 02:56

## 2023-09-03 RX ADMIN — Medication 3 MILLIGRAM(S): at 22:35

## 2023-09-03 RX ADMIN — LOSARTAN POTASSIUM 25 MILLIGRAM(S): 100 TABLET, FILM COATED ORAL at 10:53

## 2023-09-03 RX ADMIN — SIMVASTATIN 10 MILLIGRAM(S): 20 TABLET, FILM COATED ORAL at 22:35

## 2023-09-03 RX ADMIN — ENOXAPARIN SODIUM 30 MILLIGRAM(S): 100 INJECTION SUBCUTANEOUS at 10:52

## 2023-09-03 RX ADMIN — Medication 6 MILLIGRAM(S): at 10:53

## 2023-09-03 RX ADMIN — SODIUM CHLORIDE 1000 MILLILITER(S): 9 INJECTION INTRAMUSCULAR; INTRAVENOUS; SUBCUTANEOUS at 01:37

## 2023-09-03 NOTE — ED ADULT NURSE REASSESSMENT NOTE - NS ED NURSE REASSESS COMMENT FT1
Received report from previous RN Francesca Stacy. Patient is lying on stretcher on semi-fowlers position. No signs of distress noted. Patient has no complaints at this time. Call bell within reach, bed in lowest position, safety and comfort maintained. Patient noted to be lethargic but follows commands. BP noted to be elevated.

## 2023-09-03 NOTE — H&P ADULT - HISTORY OF PRESENT ILLNESS
83 year old woman with PMHx CAD, GERD, HTN, presents to the ED with weakness, and fever.  Pt states she just generalized does not feel well, also with fevers.  In ED found to be COVID positive.  She was given remdesivir and admitted for further treatment.  At bedside pt persistently weak, feeling lousy.  Denies SOB at this time.

## 2023-09-03 NOTE — H&P ADULT - NSHPPHYSICALEXAM_GEN_ALL_CORE
Vital Signs Last 24 Hrs  T(C): 37.1 (03 Sep 2023 08:37), Max: 38.1 (02 Sep 2023 20:07)  T(F): 98.7 (03 Sep 2023 08:37), Max: 100.5 (02 Sep 2023 20:07)  HR: 72 (03 Sep 2023 08:37) (64 - 90)  BP: 136/74 (03 Sep 2023 08:37) (136/74 - 186/98)  BP(mean): 100 (03 Sep 2023 03:31) (94 - 108)  RR: 18 (03 Sep 2023 08:37) (12 - 18)  SpO2: 98% (03 Sep 2023 08:37) (98% - 100%)    Parameters below as of 03 Sep 2023 08:37  Patient On (Oxygen Delivery Method): room air    PHYSICAL EXAM:    Constitutional: NAD, awake and alert, frail, elderly, weak appearing  HEENT: PERR, EOMI, Normal Hearing, MMM  Neck: Soft and supple  Respiratory: Breath sounds are clear bilaterally, No wheezing, rales or rhonchi  Cardiovascular: S1 and S2, regular rate and rhythm, no Murmurs, gallops or rubs  Gastrointestinal: Bowel Sounds present, soft, nontender, nondistended, no guarding, no rebound  Extremities: No peripheral edema  Neurological: A/O x 3, no focal deficits in my limited exam

## 2023-09-03 NOTE — H&P ADULT - NSHPLABSRESULTS_GEN_ALL_CORE
13.0   4.50  )-----------( 182      ( 02 Sep 2023 22:43 )             38.5     09-03    x   |  x   |  x   ----------------------------<  x   x    |  x   |  0.93    Ca    9.4      02 Sep 2023 22:43    TPro  6.2  /  Alb  3.2<L>  /  TBili  0.5  /  DBili  0.2  /  AST  23  /  ALT  21  /  AlkPhos  48  09-03    CAPILLARY BLOOD GLUCOSE        LIVER FUNCTIONS - ( 03 Sep 2023 07:58 )  Alb: 3.2 g/dL / Pro: 6.2 gm/dL / ALK PHOS: 48 U/L / ALT: 21 U/L / AST: 23 U/L / GGT: x           PT/INR - ( 03 Sep 2023 07:58 )   PT: 11.4 sec;   INR: 1.01 ratio         PTT - ( 02 Sep 2023 22:43 )  PTT:26.0 sec  Urinalysis Basic - ( 02 Sep 2023 22:43 )    Color: x / Appearance: x / SG: x / pH: x  Gluc: 113 mg/dL / Ketone: x  / Bili: x / Urobili: x   Blood: x / Protein: x / Nitrite: x   Leuk Esterase: x / RBC: x / WBC x   Sq Epi: x / Non Sq Epi: x / Bacteria: x

## 2023-09-03 NOTE — PATIENT PROFILE ADULT - FALL HARM RISK - HARM RISK INTERVENTIONS
Assistance with ambulation/Assistance OOB with selected safe patient handling equipment/Communicate Risk of Fall with Harm to all staff/Discuss with provider need for PT consult/Monitor gait and stability/Provide patient with walking aids - walker, cane, crutches/Reinforce activity limits and safety measures with patient and family/Tailored Fall Risk Interventions/Use of alarms - bed, chair and/or voice tab/Visual Cue: Yellow wristband and red socks/Bed in lowest position, wheels locked, appropriate side rails in place/Call bell, personal items and telephone in reach/Instruct patient to call for assistance before getting out of bed or chair/Non-slip footwear when patient is out of bed/[place] to call system/Physically safe environment - no spills, clutter or unnecessary equipment/Purposeful Proactive Rounding/Room/bathroom lighting operational, light cord in reach

## 2023-09-03 NOTE — ED ADULT NURSE REASSESSMENT NOTE - NS ED NURSE REASSESS COMMENT FT1
MD Ortega contacted for pt's elevated BP. as per MD orders, BP is to be reassessed in the next 15-30 mins. MD Ortega contacted for pt's elevated BP. as per MD orders, BP is to be reassessed in the next 15-30 mins. Also discussed with MD that pt is very lethargic but responsive to name and follows commands.

## 2023-09-03 NOTE — H&P ADULT - ASSESSMENT
83 year old woman with weakness, and fevers.      Febrile illness due to acute COVID19:  -satting well on room air, does not appear to have any significant pulmonary involvement.  -started on remdesivir in ED, continue for now  -dexamethason 6mg qd  -f/u official chest xray results  -supportive care  -may need PT eval if here for prolonged time      Toxix-metabolic encephalopathy: due to above: RESOLVED  -treat above  -supportive care      KEN vs CKD 3?  -monitor Scr      HTN / HLD:  -resume home meds    Code status:  -Full      DVT ppx:  -lovenox

## 2023-09-04 LAB
ANION GAP SERPL CALC-SCNC: 7 MMOL/L — SIGNIFICANT CHANGE UP (ref 5–17)
BUN SERPL-MCNC: 32 MG/DL — HIGH (ref 7–23)
CALCIUM SERPL-MCNC: 9.2 MG/DL — SIGNIFICANT CHANGE UP (ref 8.5–10.1)
CHLORIDE SERPL-SCNC: 112 MMOL/L — HIGH (ref 96–108)
CO2 SERPL-SCNC: 23 MMOL/L — SIGNIFICANT CHANGE UP (ref 22–31)
CREAT SERPL-MCNC: 1.15 MG/DL — SIGNIFICANT CHANGE UP (ref 0.5–1.3)
D DIMER BLD IA.RAPID-MCNC: 402 NG/ML DDU — HIGH
EGFR: 47 ML/MIN/1.73M2 — LOW
GLUCOSE SERPL-MCNC: 104 MG/DL — HIGH (ref 70–99)
HCT VFR BLD CALC: 35.4 % — SIGNIFICANT CHANGE UP (ref 34.5–45)
HGB BLD-MCNC: 12.2 G/DL — SIGNIFICANT CHANGE UP (ref 11.5–15.5)
MCHC RBC-ENTMCNC: 29.2 PG — SIGNIFICANT CHANGE UP (ref 27–34)
MCHC RBC-ENTMCNC: 34.5 GM/DL — SIGNIFICANT CHANGE UP (ref 32–36)
MCV RBC AUTO: 84.7 FL — SIGNIFICANT CHANGE UP (ref 80–100)
PLATELET # BLD AUTO: 184 K/UL — SIGNIFICANT CHANGE UP (ref 150–400)
POTASSIUM SERPL-MCNC: 3.6 MMOL/L — SIGNIFICANT CHANGE UP (ref 3.5–5.3)
POTASSIUM SERPL-SCNC: 3.6 MMOL/L — SIGNIFICANT CHANGE UP (ref 3.5–5.3)
RBC # BLD: 4.18 M/UL — SIGNIFICANT CHANGE UP (ref 3.8–5.2)
RBC # FLD: 13.2 % — SIGNIFICANT CHANGE UP (ref 10.3–14.5)
SODIUM SERPL-SCNC: 142 MMOL/L — SIGNIFICANT CHANGE UP (ref 135–145)
WBC # BLD: 4.75 K/UL — SIGNIFICANT CHANGE UP (ref 3.8–10.5)
WBC # FLD AUTO: 4.75 K/UL — SIGNIFICANT CHANGE UP (ref 3.8–10.5)

## 2023-09-04 PROCEDURE — 99232 SBSQ HOSP IP/OBS MODERATE 35: CPT

## 2023-09-04 RX ORDER — NEBIVOLOL HYDROCHLORIDE 5 MG/1
1 TABLET ORAL
Qty: 0 | Refills: 0 | DISCHARGE

## 2023-09-04 RX ORDER — SIMVASTATIN 20 MG/1
1 TABLET, FILM COATED ORAL
Qty: 0 | Refills: 0 | DISCHARGE

## 2023-09-04 RX ORDER — LOSARTAN POTASSIUM 100 MG/1
1 TABLET, FILM COATED ORAL
Qty: 0 | Refills: 0 | DISCHARGE

## 2023-09-04 RX ORDER — SIMVASTATIN 20 MG/1
1 TABLET, FILM COATED ORAL
Refills: 0 | DISCHARGE

## 2023-09-04 RX ORDER — MELOXICAM 15 MG/1
1 TABLET ORAL
Qty: 0 | Refills: 0 | DISCHARGE

## 2023-09-04 RX ADMIN — SIMVASTATIN 10 MILLIGRAM(S): 20 TABLET, FILM COATED ORAL at 22:35

## 2023-09-04 RX ADMIN — REMDESIVIR 200 MILLIGRAM(S): 5 INJECTION INTRAVENOUS at 03:06

## 2023-09-04 RX ADMIN — ENOXAPARIN SODIUM 30 MILLIGRAM(S): 100 INJECTION SUBCUTANEOUS at 10:55

## 2023-09-04 RX ADMIN — LOSARTAN POTASSIUM 25 MILLIGRAM(S): 100 TABLET, FILM COATED ORAL at 09:16

## 2023-09-04 RX ADMIN — Medication 6 MILLIGRAM(S): at 09:16

## 2023-09-04 NOTE — DIETITIAN INITIAL EVALUATION ADULT - PERTINENT LABORATORY DATA
09-04    142  |  112<H>  |  32<H>  ----------------------------<  104<H>  3.6   |  23  |  1.15    Ca    9.2      04 Sep 2023 08:14    TPro  6.2  /  Alb  3.2<L>  /  TBili  0.5  /  DBili  0.2  /  AST  23  /  ALT  21  /  AlkPhos  48  09-03

## 2023-09-04 NOTE — DIETITIAN INITIAL EVALUATION ADULT - ENTER TO (ML/KG)
[FreeTextEntry3] : \par Injection Procedure Note:\par \par The risks, benefits, and alternatives to corticosteroid injection were reviewed with the patient.  Risks outlined include but are not limited to infection, sepsis, bleeding, scarring, skin discoloration, temporary increase in pain, syncopal episode, failure to resolve symptoms, symptoms recurrence, allergic reaction, flare reaction, and elevation of blood sugar in diabetics.  Patient understood the risks and asked to proceed with this treatment course.\par  \par Patient Identification\par Name/: Verbal with patient and/or family\par  \par Procedure Verification:\par Procedure confirmed with patient or family/designee\par Consent for procedure: Verbal Consent Given\par Relevant documentation completed, reviewed, and signed\par Clinical indications for procedure confirmed\par \par Time-out with all members of procedure team immediately prior to procedure:\par Correct patient identified. Agreement on procedure. Correct side and site.\par \par KNEE INJECTION (STEROID) - RIGHT\par After verbal consent and identification of the correct patient and correct site, the superolateral right knee was prepped using alcohol swabs and betadine. This was allowed time to air dry. A mixture of 1cc DepoMedrol 40mg/ml, 3cc Lidocaine 1%, and 3cc Bupivacaine 0.5% was injected into the suprapatellar pouch using a sterile 22G needle after ethyl chloride spray for skin anesthesia. The patient tolerated the procedure well. After-care instructions were provided and included instructions to ice the area and to call if redness, pain, or fever develop.\par 
35

## 2023-09-04 NOTE — DIETITIAN NUTRITION RISK NOTIFICATION - ADDITIONAL COMMENTS/DIETITIAN RECOMMENDATIONS
1) C/w regular diet as tolerated  2) Add ensure + HP shake BID (350kcal, 20g protein)  3) Monitor bowel movements, if no BM for >3 days, consider implementing bowel regimen.  4) Encourage protein-rich foods, maximize food preferences  5) MVI w/ minerals daily to ensure 100% RDA met  6) Consider adding thiamine 100 mg daily 2/2 poor PO intake/ malnutrition  7) Total assistance/supervision during meal times; suggest meal encouragement to optimize po intake  8) Monitor lytes/ min and replete prn.  9) Confirm goals of care regarding nutrition support  RD will continue to monitor PO intake, labs, hydration, and wt prn.

## 2023-09-04 NOTE — DIETITIAN INITIAL EVALUATION ADULT - PHYSICAL ASSESSMENT CALF
Problem: Adult Inpatient Plan of Care  Goal: Plan of Care Review  Outcome: Ongoing, Progressing  Flowsheets (Taken 1/8/2021 0314)  Progress: no change  Plan of Care Reviewed With: patient  Outcome Summary: Pt complains of pain, see MAR. No complaints of nausea. Pulses dopplered +2. Pure wick in place. VSS. Will cont to monitor.      severe

## 2023-09-04 NOTE — DIETITIAN INITIAL EVALUATION ADULT - PERTINENT MEDS FT
MEDICATIONS  (STANDING):  dexAMETHasone     Tablet 6 milliGRAM(s) Oral daily  enoxaparin Injectable 30 milliGRAM(s) SubCutaneous every 24 hours  losartan 25 milliGRAM(s) Oral daily  remdesivir  IVPB   IV Intermittent   remdesivir  IVPB 100 milliGRAM(s) IV Intermittent every 24 hours  simvastatin 10 milliGRAM(s) Oral at bedtime    MEDICATIONS  (PRN):  acetaminophen     Tablet .. 650 milliGRAM(s) Oral every 6 hours PRN Temp greater or equal to 38.5C (101.3F), Mild Pain (1 - 3), Moderate Pain (4 - 6)  aluminum hydroxide/magnesium hydroxide/simethicone Suspension 30 milliLiter(s) Oral every 4 hours PRN Dyspepsia  melatonin 3 milliGRAM(s) Oral at bedtime PRN Insomnia  ondansetron Injectable 4 milliGRAM(s) IV Push every 8 hours PRN Nausea and/or Vomiting

## 2023-09-04 NOTE — DIETITIAN INITIAL EVALUATION ADULT - OTHER INFO
83 year old woman with PMHx CAD, GERD, HTN, presents to the ED with weakness, and fever.  Pt states she just generalized does not feel well, also with fevers.  In ED found to be COVID positive. She was given remdesivir and admitted for further treatment and at bedside pt persistently weak, feeling lousy.   Admit for fever due to COVID+ , Toxic-metabolic encephalopathy    Remains FULL CODE. Unable to obtain diet/wt hx 2/2 AMS, appears confused. RD observed break fast tray, pt having trouble feeding self - rec'd supervision/total assistance during meal times to optimize PO intake & nutrition. RD spoke w/ aide & will help w/ feedings at this time. Bed scale wt of 112# taken by RD on 9/4/23. Unable to identify any pertinent wt changes at this time. NFPE reveals severe muscle/ fat wasting - pt appears thin, frail, and marcelino. C/w regular diet as tolerated, add ensure + HP shake BID (350kcal, 20g protein) to optimize nutrition. See below for other recommendations.

## 2023-09-05 ENCOUNTER — TRANSCRIPTION ENCOUNTER (OUTPATIENT)
Age: 83
End: 2023-09-05

## 2023-09-05 DIAGNOSIS — U07.1 COVID-19: ICD-10-CM

## 2023-09-05 DIAGNOSIS — E78.00 PURE HYPERCHOLESTEROLEMIA, UNSPECIFIED: ICD-10-CM

## 2023-09-05 DIAGNOSIS — R53.1 WEAKNESS: ICD-10-CM

## 2023-09-05 DIAGNOSIS — I10 ESSENTIAL (PRIMARY) HYPERTENSION: ICD-10-CM

## 2023-09-05 DIAGNOSIS — I34.0 NONRHEUMATIC MITRAL (VALVE) INSUFFICIENCY: ICD-10-CM

## 2023-09-05 LAB
ALBUMIN SERPL ELPH-MCNC: 3.3 G/DL — SIGNIFICANT CHANGE UP (ref 3.3–5)
ALP SERPL-CCNC: 49 U/L — SIGNIFICANT CHANGE UP (ref 40–120)
ALT FLD-CCNC: 25 U/L — SIGNIFICANT CHANGE UP (ref 12–78)
ANION GAP SERPL CALC-SCNC: 7 MMOL/L — SIGNIFICANT CHANGE UP (ref 5–17)
AST SERPL-CCNC: 22 U/L — SIGNIFICANT CHANGE UP (ref 15–37)
BILIRUB SERPL-MCNC: 0.3 MG/DL — SIGNIFICANT CHANGE UP (ref 0.2–1.2)
BUN SERPL-MCNC: 29 MG/DL — HIGH (ref 7–23)
CALCIUM SERPL-MCNC: 8.9 MG/DL — SIGNIFICANT CHANGE UP (ref 8.5–10.1)
CHLORIDE SERPL-SCNC: 114 MMOL/L — HIGH (ref 96–108)
CO2 SERPL-SCNC: 23 MMOL/L — SIGNIFICANT CHANGE UP (ref 22–31)
CREAT SERPL-MCNC: 1.09 MG/DL — SIGNIFICANT CHANGE UP (ref 0.5–1.3)
EGFR: 50 ML/MIN/1.73M2 — LOW
GLUCOSE SERPL-MCNC: 95 MG/DL — SIGNIFICANT CHANGE UP (ref 70–99)
HCT VFR BLD CALC: 37 % — SIGNIFICANT CHANGE UP (ref 34.5–45)
HGB BLD-MCNC: 12.5 G/DL — SIGNIFICANT CHANGE UP (ref 11.5–15.5)
MCHC RBC-ENTMCNC: 29 PG — SIGNIFICANT CHANGE UP (ref 27–34)
MCHC RBC-ENTMCNC: 33.8 GM/DL — SIGNIFICANT CHANGE UP (ref 32–36)
MCV RBC AUTO: 85.8 FL — SIGNIFICANT CHANGE UP (ref 80–100)
PLATELET # BLD AUTO: 189 K/UL — SIGNIFICANT CHANGE UP (ref 150–400)
POTASSIUM SERPL-MCNC: 3.7 MMOL/L — SIGNIFICANT CHANGE UP (ref 3.5–5.3)
POTASSIUM SERPL-SCNC: 3.7 MMOL/L — SIGNIFICANT CHANGE UP (ref 3.5–5.3)
PROT SERPL-MCNC: 6.1 GM/DL — SIGNIFICANT CHANGE UP (ref 6–8.3)
RBC # BLD: 4.31 M/UL — SIGNIFICANT CHANGE UP (ref 3.8–5.2)
RBC # FLD: 13.3 % — SIGNIFICANT CHANGE UP (ref 10.3–14.5)
SODIUM SERPL-SCNC: 144 MMOL/L — SIGNIFICANT CHANGE UP (ref 135–145)
WBC # BLD: 6.53 K/UL — SIGNIFICANT CHANGE UP (ref 3.8–10.5)
WBC # FLD AUTO: 6.53 K/UL — SIGNIFICANT CHANGE UP (ref 3.8–10.5)

## 2023-09-05 PROCEDURE — 99239 HOSP IP/OBS DSCHRG MGMT >30: CPT

## 2023-09-05 RX ORDER — LOSARTAN POTASSIUM 100 MG/1
1 TABLET, FILM COATED ORAL
Qty: 0 | Refills: 0 | DISCHARGE
Start: 2023-09-05

## 2023-09-05 RX ORDER — ACETAMINOPHEN 500 MG
2 TABLET ORAL
Qty: 0 | Refills: 0 | DISCHARGE
Start: 2023-09-05

## 2023-09-05 RX ORDER — ASPIRIN/CALCIUM CARB/MAGNESIUM 324 MG
1 TABLET ORAL
Qty: 30 | Refills: 0
Start: 2023-09-05 | End: 2023-10-04

## 2023-09-05 RX ADMIN — LOSARTAN POTASSIUM 25 MILLIGRAM(S): 100 TABLET, FILM COATED ORAL at 09:28

## 2023-09-05 RX ADMIN — Medication 6 MILLIGRAM(S): at 09:28

## 2023-09-05 RX ADMIN — ENOXAPARIN SODIUM 30 MILLIGRAM(S): 100 INJECTION SUBCUTANEOUS at 09:28

## 2023-09-05 RX ADMIN — SIMVASTATIN 10 MILLIGRAM(S): 20 TABLET, FILM COATED ORAL at 21:06

## 2023-09-05 RX ADMIN — REMDESIVIR 200 MILLIGRAM(S): 5 INJECTION INTRAVENOUS at 03:08

## 2023-09-05 NOTE — PHYSICAL THERAPY INITIAL EVALUATION ADULT - GENERAL OBSERVATIONS, REHAB EVAL
Pt seen for 45min PT Eval. Pt rec'd semi supine in bed in NAD confused, pleasant, willing to participate with PT. Pt requires min-mod AX1 for all mobility. Pt trans and amb 2 steps to HOB with RW, strong posterior lean. Pt returned semi supine, all needs met, +bed alarm, RN aware

## 2023-09-05 NOTE — PHYSICAL THERAPY INITIAL EVALUATION ADULT - PERTINENT HX OF CURRENT PROBLEM, REHAB EVAL
84yo Female who presents with a chief complaint of Infection due to severe acute respiratory syndrome coronavirus 2 (SARS-CoV-2).    CT HEAD; No acute intracranial hemorrhage or mass effect. Chronic changes, as described above.  RVP: +Covid19

## 2023-09-05 NOTE — PHYSICAL THERAPY INITIAL EVALUATION ADULT - TRANSFER SAFETY CONCERNS NOTED: SIT/STAND, REHAB EVAL
posterior lean/decreased balance during turns/decreased safety awareness/decreased step length/decreased weight-shifting ability

## 2023-09-05 NOTE — CONSULT NOTE ADULT - SUBJECTIVE AND OBJECTIVE BOX
CHIEF COMPLAINT:  Patient is a 83y old  Female who presents with a chief complaint of Infection due to severe acute respiratory syndrome coronavirus 2 (SARS-CoV-2)     (04 Sep 2023 13:34)      HPI: 23:  83 year old woman, well known to me, with PMHx CAD, GERD, HTN and hyperlipidemia, presents to the ED with weakness, and fever.  Pt states she just generalized does not feel well, also with fevers.  In ED found to be COVID positive.  She was given Remdesivir and admitted for further treatment.  At bedside pt persistently weak, feeling lousy.  Denies SOB at this time.  She has no anginal chest pains or increased SOB or other new cardiac symptoms.  She is currently tolerating her meds and Remdesivir.  Echo in my office on 22 showed Normal LV systolic function with LVEF=60-65% with trace AI, mild PI, MR & TR but otherwise a normal study.         PMHx:  PAST MEDICAL & SURGICAL HISTORY:  HTN (hypertension)  Scoliosis  Hypercholesteremia  GERD (gastroesophageal reflux disease)  Cardiac murmur  CAD (coronary artery disease)  Mitral regurgitation  Osteopenia  H/O spinal fusion-lumbar      FAMILY HISTORY:   FAMILY HISTORY:  FH: bilateral hip replacements      ALLERGIES:  Allergies  No Known Allergies      REVIEW OF SYSTEMS:  10 point ROS was obtained  Pertinent positives and negatives are as above  All other review of systems is negative unless indicated above      Vital Signs Last 24 Hrs  T(C): 36.8 (05 Sep 2023 05:57), Max: 37.1 (04 Sep 2023 08:36)  T(F): 98.2 (05 Sep 2023 05:57), Max: 98.7 (04 Sep 2023 08:36)  HR: 55 (05 Sep 2023 05:57) (55 - 69)  BP: 134/88 (05 Sep 2023 05:57) (121/70 - 171/91)  RR: 18 (05 Sep 2023 05:57) (18 - 19)  SpO2: 97% (05 Sep 2023 05:57) (94% - 97%): room air      PHYSICAL EXAM:   Constitutional: NAD, awake and alert, well-developed  HEENT: PERR, EOMI, Normal Hearing, MMM  Neck: Soft and supple, No LAD, No JVD  Respiratory: Breath sounds are clear bilaterally, No wheezing, rales or rhonchi  Cardiovascular: S1 and S2, regular rate and rhythm, soft FELIX at LLSB and base as before, no gallops or rubs  Gastrointestinal: Bowel Sounds present, soft, nontender, nondistended, no guarding, no rebound  Extremities: No peripheral edema  Vascular: 2+ peripheral pulses  Neurological: no focal deficits      MEDICATIONS  (STANDING):  dexAMETHasone     Tablet 6 milliGRAM(s) Oral daily  enoxaparin Injectable 30 milliGRAM(s) SubCutaneous every 24 hours  losartan 25 milliGRAM(s) Oral daily  remdesivir  IVPB   IV Intermittent   remdesivir  IVPB 100 milliGRAM(s) IV Intermittent every 24 hours  simvastatin 10 milliGRAM(s) Oral at bedtime    MEDICATIONS  (PRN):  acetaminophen     Tablet .. 650 milliGRAM(s) Oral every 6 hours PRN Temp greater or equal to 38.5C (101.3F), Mild Pain (1 - 3), Moderate Pain (4 - 6)  aluminum hydroxide/magnesium hydroxide/simethicone Suspension 30 milliLiter(s) Oral every 4 hours PRN Dyspepsia  melatonin 3 milliGRAM(s) Oral at bedtime PRN Insomnia  ondansetron Injectable 4 milliGRAM(s) IV Push every 8 hours PRN Nausea and/or Vomiting      LABS: All Labs Reviewed:    Respiratory Viral Panel with COVID-19 by LONI (23 @ 22:43)   Rapid RVP Result: Detected  SARS-CoV-2: Detected    D-Dimer Assay, Quantitative (23 @ 08:14): 402                        12.2   4.75  )-----------( 184      ( 04 Sep 2023 08:14 )             35.4     -    142  |  112<H>  |  32<H>  ----------------------------<  104<H>  3.6   |  23  |  1.15    Ca    9.2      04 Sep 2023 08:14    TPro  6.2  /  Alb  3.2<L>  /  TBili  0.5  /  DBili  0.2  /  AST  23  /  ALT  21  /  AlkPhos  48  09-03    PT/INR - ( 03 Sep 2023 07:58 )   PT: 11.4 sec;   INR: 1.01 ratio        BLOOD CULTURES: Organism --  Gram Stain Blood -- Gram Stain --  Specimen Source .Blood Blood-Peripheral  Culture-Blood --Culture Results: No growth at 48 Hours      LIPID PROFILE     RADIOLOGY:    CXR: 23:  INTERPRETATION:  Fever and weakness.  AP chest.  Heart exaggerated by AP film shallow inspiration but there is a left ventricular configuration. Cardiac apex obscures evaluation left base. No gross infiltrates or significant pleural effusion.  IMPRESSION: Grossly clear lungs.    CT of Head: 23:  FINDINGS:  There is no CT evidence of acute intracranial hemorrhage, mass effect, midline shift, or acute, large territorial infarct.  The ventricles and sulci are prominent compatible with moderate generalized brain parenchymal volume loss. Patchy periventricular and subcortical white matter hypodensities are nonspecific, although likely due to chronic microangiopathy. The basal cisterns are patent.  There are atherosclerotic calcifications at the skull base.  The mastoid air cells and middle ear cavities are grossly clear. There is mucosal thickening within left ethmoid air cells, and mild mucosal thickening in the visualized left maxillary sinus. There are bilateral ocular lens replacements.  The calvarium and skull base are grossly intact.  IMPRESSION:  No acute intracranial hemorrhage or mass effect.  Chronic changes, as described above.      EK23:  Normal sinus rhythm  Possible Left atrial enlargement  Left axis deviation  Left ventricular hypertrophy  Nonspecific T wave abnormality      TELEMETRY:      ECHO:  Echo in my office on 22 showed Normal LV systolic function with LVEF=60-65% with trace AI, mild PI, MR & TR but otherwise a normal study.
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
## 2023-09-05 NOTE — DISCHARGE NOTE PROVIDER - NSDCCPCAREPLAN_GEN_ALL_CORE_FT
PRINCIPAL DISCHARGE DIAGNOSIS  Diagnosis: 2019 novel coronavirus disease (COVID-19)  Assessment and Plan of Treatment: COVID-19 (coronavirus disease) is an infection that is caused by a large family of viruses. Some viruses cause illness in people and others cause illness in animals like camels, cats, and bats. In some cases, the viruses that cause illness in animals can spread to humans  Early on, this disease was called novel coronavirus. This is because scientists determined that the disease was caused by a new (novel) respiratory virus. The World Health Organization (WHO) has now named the disease COVID-19, or coronavirus disease.Some people may be at higher risk for complications from coronavirus disease. This includes older adults and people who have chronic diseases, such as heart disease, diabetes, and lung disease.  If you are at higher risk for complications, take these extra precautions:  •Stay home as much as possible.  •Avoid social gatherings and travel.  •Avoid close contact with others. Stay at least 6 ft (2 m) away from others, if possible.  •Wash your hands often with soap and water for at least 20 seconds.  •Avoid touching your face, mouth, nose, or eyes.  •Keep supplies on hand at home, such as food, medicine, and cleaning supplies.  •If you must go out in public, wear a cloth face covering or face mask. Make sure your mask covers your nose and mouth.  *** Monitor for the following signs/symptoms and contact your care provider if they occur or go to the ER if your symptoms are severe.   •Trouble breathing.  •Pain or pressure in your chest.  •Confusion.  •Blue-tinged lips and fingernails.  •Difficulty waking from sleep.  •Symptoms that get worse.      SECONDARY DISCHARGE DIAGNOSES  Diagnosis: KEN (acute kidney injury)  Assessment and Plan of Treatment: You were found to have elevations in labs that monitor your kidney function (creatinine) which indicates a decline in your kidney function which was due to dehydration and acute covid infection. Continue to stay well hydrated. Follow up with your primary care doctor.    Diagnosis: Metabolic encephalopathy  Assessment and Plan of Treatment:

## 2023-09-05 NOTE — DISCHARGE NOTE PROVIDER - NSDCPNSUBOBJ_GEN_ALL_CORE
All 10 systems reviewed and found to be negative with the exception of what has been described above.    Vital Signs Last 24 Hrs  T(C): 36.8 (05 Sep 2023 08:27), Max: 36.8 (05 Sep 2023 05:57)  T(F): 98.2 (05 Sep 2023 08:27), Max: 98.2 (05 Sep 2023 05:57)  HR: 57 (05 Sep 2023 08:27) (55 - 69)  BP: 127/89 (05 Sep 2023 08:27) (121/70 - 134/88)  BP(mean): --  RR: 18 (05 Sep 2023 08:27) (18 - 19)  SpO2: 96% (05 Sep 2023 08:27) (94% - 97%) --- room air                               12.5   6.53  )-----------( 189      ( 05 Sep 2023 08:39 )             37.0     09-05    144  |  114<H>  |  29<H>  ----------------------------<  95  3.7   |  23  |  1.09    Ca    8.9      05 Sep 2023 08:39    TPro  6.1  /  Alb  3.3  /  TBili  0.3  /  DBili  x   /  AST  22  /  ALT  25  /  AlkPhos  49  09-05        LIVER FUNCTIONS - ( 05 Sep 2023 08:39 )  Alb: 3.3 g/dL / Pro: 6.1 gm/dL / ALK PHOS: 49 U/L / ALT: 25 U/L / AST: 22 U/L / GGT: x             Urinalysis Basic - ( 05 Sep 2023 08:39 )    Color: x / Appearance: x / SG: x / pH: x  Gluc: 95 mg/dL / Ketone: x  / Bili: x / Urobili: x   Blood: x / Protein: x / Nitrite: x   Leuk Esterase: x / RBC: x / WBC x   Sq Epi: x / Non Sq Epi: x / Bacteria: x      Rapid RVP Result: Detected (09.02.23 @ 22:43)      PHYSICAL EXAM:  GEN: NAD  HEENT:  pupils equal and reactive, EOMI, no oropharyngeal lesions, erythema, exudates, oral thrush  NECK:   supple, no carotid bruits, no palpable lymph nodes, no thyromegaly  CV:  +S1, +S2, regular, no murmurs or rubs  RESP:   lungs clear to auscultation bilaterally, no wheezing, rales, rhonchi, good air entry bilaterally  GI:  abdomen soft, non-tender, non-distended, normal BS, no bruits, no abdominal masses, no palpable masses  MSK:   normal muscle tone, no atrophy, no rigidity, no contractions  EXT:  no clubbing, no cyanosis, no edema, no calf pain, swelling or erythema  VASCULAR:  pulses equal and symmetric in the upper and lower extremities  NEURO:  AAOX1, no focal neurological deficits, follows all commands, able to move extremities spontaneously  SKIN:  no ulcers, lesions or rashes

## 2023-09-05 NOTE — PHYSICAL THERAPY INITIAL EVALUATION ADULT - ADDITIONAL COMMENTS
pt lives with Sig Other of 18 yrs. how assists with ADLs. Pt also Aides M/W/F for 10 hrs and Tues for 6 hours. Pt uses walker to ambulate and has a stairlift.

## 2023-09-05 NOTE — DISCHARGE NOTE PROVIDER - NSDCMRMEDTOKEN_GEN_ALL_CORE_FT
acetaminophen 325 mg oral tablet: 2 tab(s) orally every 6 hours As needed Temp greater or equal to 38.5C (101.3F), Mild Pain (1 - 3), Moderate Pain (4 - 6)  losartan 25 mg oral tablet: 1 tab(s) orally once a day  simvastatin 10 mg oral tablet: 1 tab(s) orally once a day

## 2023-09-05 NOTE — DISCHARGE NOTE PROVIDER - NSDCCAREPROVSEEN_GEN_ALL_CORE_FT
Erika, Efrain Miles, Luca Pina, Aaron Summers, David Aguiar, Grant Theodore, Thang Guallpa, Debbie Lane

## 2023-09-05 NOTE — DISCHARGE NOTE PROVIDER - DETAILS OF MALNUTRITION DIAGNOSIS/DIAGNOSES
This patient has been assessed with a concern for Malnutrition and was treated during this hospitalization for the following Nutrition diagnosis/diagnoses:     -  09/04/2023: Severe protein-calorie malnutrition   -  09/04/2023: Underweight (BMI < 19)

## 2023-09-05 NOTE — DISCHARGE NOTE PROVIDER - CARE PROVIDER_API CALL
Efrain James  Cardiovascular Disease  [address], [address]  [place] [address]  Phone: [phone]  Fax: [phone]  Follow Up Time:

## 2023-09-05 NOTE — PHYSICAL THERAPY INITIAL EVALUATION ADULT - GAIT DEVIATIONS NOTED, PT EVAL
posterior lean/decreased zhou/decreased velocity of limb motion/decreased step length/decreased weight-shifting ability

## 2023-09-05 NOTE — CONSULT NOTE ADULT - ASSESSMENT
9/5/23:  Pt with above history and weakness due to COVID.  No new cardiac issues and tolerating meds and treatments so far.  No clinical angina or CHF and no indication for further cardiac testing at this time.  Continue as outlined by medicine etal.  Will follow intermittently prn and as an outpt as needed after discharge.

## 2023-09-05 NOTE — DISCHARGE NOTE PROVIDER - HOSPITAL COURSE
83 year old woman with PMHx CAD, GERD, HTN, presents to the ED with weakness, and fever.  Pt states she just generalized does not feel well, also with fevers.  In ED found to be COVID positive.  She was given remdesivir and admitted for further treatment.  At bedside pt persistently weak, feeling lousy.  Denies SOB at this time.    pt admitted for acute febrile illness due to covid infection. was treated with remdesivir and dexamethasone. hypoxia and fever resolved. pt was followed by cardio Dr. James Pt with above history and weakness due to COVID.  No new cardiac issues and tolerating meds and treatments so far.  No clinical angina or CHF and no indication for further cardiac testing at this time.  Continue as outlined by medicine etal.  Will follow intermittently prn and as an outpt as needed after discharge. pt evaled by PT, reccs for [facility] upon dc. no issues during admission. pt will be dc on PO Aspirin for 30 dyas for DVT prophylaxis.     # Febrile illness due to acute COVID19:  --- acute febrile illness resolved   - Hypoxia resolved   - Completed Remdesivir  - completed dexamethason 6mg qd  - Chest xray negative  - supportive care  - PT evaled --- dc to [facility]     # Toxic-metabolic encephalopathy:   - due to above   - treat above  - supportive care    # KEN  - Resolved     # HTN  - Stable  - Continue losartan    # HLD:  - continue simvastatin    # Code status:  -Full    # DVT ppx:  -s/p lovenox  ----- dc on ASA 81mg for covid dvt prophylaxis

## 2023-09-06 ENCOUNTER — TRANSCRIPTION ENCOUNTER (OUTPATIENT)
Age: 83
End: 2023-09-06

## 2023-09-06 VITALS
RESPIRATION RATE: 18 BRPM | HEART RATE: 62 BPM | TEMPERATURE: 98 F | DIASTOLIC BLOOD PRESSURE: 82 MMHG | SYSTOLIC BLOOD PRESSURE: 140 MMHG | OXYGEN SATURATION: 95 %

## 2023-09-06 RX ADMIN — LOSARTAN POTASSIUM 25 MILLIGRAM(S): 100 TABLET, FILM COATED ORAL at 09:28

## 2023-09-06 RX ADMIN — Medication 6 MILLIGRAM(S): at 09:28

## 2023-09-06 RX ADMIN — ENOXAPARIN SODIUM 30 MILLIGRAM(S): 100 INJECTION SUBCUTANEOUS at 09:27

## 2023-09-06 RX ADMIN — REMDESIVIR 200 MILLIGRAM(S): 5 INJECTION INTRAVENOUS at 01:37

## 2023-09-06 NOTE — PROGRESS NOTE ADULT - SUBJECTIVE AND OBJECTIVE BOX
HOSPITALIST ATTENDING PROGRESS NOTE    Chart and meds reviewed.  Patient seen and examined.    CC: Fever    Subjective: Confused, no acute issues overnight. No fever.     All other systems reviewed and found to be negative with the exception of what has been described above.    MEDICATIONS  (STANDING):  dexAMETHasone     Tablet 6 milliGRAM(s) Oral daily  enoxaparin Injectable 30 milliGRAM(s) SubCutaneous every 24 hours  losartan 25 milliGRAM(s) Oral daily  remdesivir  IVPB 100 milliGRAM(s) IV Intermittent every 24 hours  remdesivir  IVPB   IV Intermittent   simvastatin 10 milliGRAM(s) Oral at bedtime    MEDICATIONS  (PRN):  acetaminophen     Tablet .. 650 milliGRAM(s) Oral every 6 hours PRN Temp greater or equal to 38.5C (101.3F), Mild Pain (1 - 3), Moderate Pain (4 - 6)  aluminum hydroxide/magnesium hydroxide/simethicone Suspension 30 milliLiter(s) Oral every 4 hours PRN Dyspepsia  melatonin 3 milliGRAM(s) Oral at bedtime PRN Insomnia  ondansetron Injectable 4 milliGRAM(s) IV Push every 8 hours PRN Nausea and/or Vomiting      VITALS:  T(F): 98.7 (09-04-23 @ 08:36), Max: 98.7 (09-04-23 @ 08:36)  HR: 62 (09-04-23 @ 08:36) (62 - 97)  BP: 171/91 (09-04-23 @ 08:36) (108/57 - 171/91)  RR: 18 (09-04-23 @ 08:36) (18 - 18)  SpO2: 97% (09-04-23 @ 08:36) (95% - 97%)      PHYSICAL EXAM:  GEN: NAD  HEENT:  pupils equal and reactive, EOMI, no oropharyngeal lesions, erythema, exudates, oral thrush  NECK:   supple, no carotid bruits, no palpable lymph nodes, no thyromegaly  CV:  +S1, +S2, regular, no murmurs or rubs  RESP:   lungs clear to auscultation bilaterally, no wheezing, rales, rhonchi, good air entry bilaterally  BREAST:  not examined  GI:  abdomen soft, non-tender, non-distended, normal BS, no bruits, no abdominal masses, no palpable masses  RECTAL:  not examined  :  not examined  MSK:   normal muscle tone, no atrophy, no rigidity, no contractions  EXT:  no clubbing, no cyanosis, no edema, no calf pain, swelling or erythema  VASCULAR:  pulses equal and symmetric in the upper and lower extremities  NEURO:  AAOX1, no focal neurological deficits, follows all commands, able to move extremities spontaneously  SKIN:  no ulcers, lesions or rashes    LABS:                            12.2   4.75  )-----------( 184      ( 04 Sep 2023 08:14 )             35.4     09-04    142  |  112<H>  |  32<H>  ----------------------------<  104<H>  3.6   |  23  |  1.15    Ca    9.2      04 Sep 2023 08:14    TPro  6.2  /  Alb  3.2<L>  /  TBili  0.5  /  DBili  0.2  /  AST  23  /  ALT  21  /  AlkPhos  48  09-03        LIVER FUNCTIONS - ( 03 Sep 2023 07:58 )  Alb: 3.2 g/dL / Pro: 6.2 gm/dL / ALK PHOS: 48 U/L / ALT: 21 U/L / AST: 23 U/L / GGT: x           PT/INR - ( 03 Sep 2023 07:58 )   PT: 11.4 sec;   INR: 1.01 ratio    PTT - ( 02 Sep 2023 22:43 )  PTT:26.0 sec    Urinalysis Basic - ( 04 Sep 2023 08:14 )  Color: x / Appearance: x / SG: x / pH: x  Gluc: 104 mg/dL / Ketone: x  / Bili: x / Urobili: x   Blood: x / Protein: x / Nitrite: x   Leuk Esterase: x / RBC: x / WBC x   Sq Epi: x / Non Sq Epi: x / Bacteria: x      : CT Head No Cont (09.02.23 @ 23:47) >  IMPRESSION:  No acute intracranial hemorrhage or mass effect.  Chronic changes, as described above.     Xray Chest 1 View- PORTABLE-Urgent (09.02.23 @ 23:10) >  IMPRESSION: Grossly clear lungs.      
  CHIEF COMPLAINT:  Patient is a 83y old  Female who presents with a chief complaint of Infection due to severe acute respiratory syndrome coronavirus 2 (SARS-CoV-2)     (04 Sep 2023 13:34)      HPI: 23:  83 year old woman, well known to me, with PMHx CAD, GERD, HTN and hyperlipidemia, presents to the ED with weakness, and fever.  Pt states she just generalized does not feel well, also with fevers.  In ED found to be COVID positive.  She was given Remdesivir and admitted for further treatment.  At bedside pt persistently weak, feeling lousy.  Denies SOB at this time.  She has no anginal chest pains or increased SOB or other new cardiac symptoms.  She is currently tolerating her meds and Remdesivir.  Echo in my office on 22 showed Normal LV systolic function with LVEF=60-65% with trace AI, mild PI, MR & TR but otherwise a normal study.     23:  Resting comfortably this AM.  No new cardiac issues.  For possible transfer to [facility] today.  Stable from a cardiac standpoint so far.          PMHx:  PAST MEDICAL & SURGICAL HISTORY:  HTN (hypertension)  Scoliosis  Hypercholesteremia  GERD (gastroesophageal reflux disease)  Cardiac murmur  CAD (coronary artery disease)  Mitral regurgitation  Osteopenia  H/O spinal fusion-lumbar      FAMILY HISTORY:   FAMILY HISTORY:  FH: bilateral hip replacements      ALLERGIES:  Allergies  No Known Allergies      REVIEW OF SYSTEMS:  10 point ROS was obtained  Pertinent positives and negatives are as above  All other review of systems is negative unless indicated above      Vital Signs Last 24 Hrs  T(C): 36.9 (05 Sep 2023 23:02), Max: 36.9 (05 Sep 2023 23:02)  T(F): 98.4 (05 Sep 2023 23:02), Max: 98.4 (05 Sep 2023 23:02)  HR: 86 (05 Sep 2023 23:02) (57 - 86)  BP: 134/75 (05 Sep 2023 23:02) (127/89 - 156/66)  RR: 18 (05 Sep 2023 23:02) (18 - 18)  SpO2: 99% (05 Sep 2023 23:02) (95% - 99%): room air      PHYSICAL EXAM:   Constitutional: NAD, awake and alert, well-developed  HEENT: PERR, EOMI, Normal Hearing, MMM  Neck: Soft and supple, No LAD, No JVD  Respiratory: Breath sounds are clear bilaterally, No wheezing, rales or rhonchi  Cardiovascular: S1 and S2, regular rate and rhythm, soft FELIX at LLSB and base as before, no gallops or rubs  Gastrointestinal: Bowel Sounds present, soft, nontender, nondistended, no guarding, no rebound  Extremities: No peripheral edema  Vascular: 2+ peripheral pulses  Neurological: no focal deficits      MEDICATIONS  (STANDING):  dexAMETHasone     Tablet 6 milliGRAM(s) Oral daily  enoxaparin Injectable 30 milliGRAM(s) SubCutaneous every 24 hours  losartan 25 milliGRAM(s) Oral daily  remdesivir  IVPB   IV Intermittent   remdesivir  IVPB 100 milliGRAM(s) IV Intermittent every 24 hours  simvastatin 10 milliGRAM(s) Oral at bedtime    MEDICATIONS  (PRN):  acetaminophen     Tablet .. 650 milliGRAM(s) Oral every 6 hours PRN Temp greater or equal to 38.5C (101.3F), Mild Pain (1 - 3), Moderate Pain (4 - 6)  aluminum hydroxide/magnesium hydroxide/simethicone Suspension 30 milliLiter(s) Oral every 4 hours PRN Dyspepsia  melatonin 3 milliGRAM(s) Oral at bedtime PRN Insomnia  ondansetron Injectable 4 milliGRAM(s) IV Push every 8 hours PRN Nausea and/or Vomiting      LABS: All Labs Reviewed:    Respiratory Viral Panel with COVID-19 by LONI (23 @ 22:43)   Rapid RVP Result: Detected  SARS-CoV-2: Detected    D-Dimer Assay, Quantitative (23 @ 08:14): 402                          12.5   6.53  )-----------( 189      ( 05 Sep 2023 08:39 )             37.0                           12.2   4.75  )-----------( 184      ( 04 Sep 2023 08:14 )             35.4       09-05    144  |  114<H>  |  29<H>  ----------------------------<  95  3.7   |  23  |  1.09    Ca    8.9      05 Sep 2023 08:39    TPro  6.1  /  Alb  3.3  /  TBili  0.3  /  DBili  x   /  AST  22  /  ALT  25  /  AlkPhos  49  09-05      09-04    142  |  112<H>  |  32<H>  ----------------------------<  104<H>  3.6   |  23  |  1.15    Ca    9.2      04 Sep 2023 08:14    TPro  6.2  /  Alb  3.2<L>  /  TBili  0.5  /  DBili  0.2  /  AST  23  /  ALT  21  /  AlkPhos  48  09-03    PT/INR - ( 03 Sep 2023 07:58 )   PT: 11.4 sec;   INR: 1.01 ratio        BLOOD CULTURES: Organism --  Gram Stain Blood -- Gram Stain --  Specimen Source .Blood Blood-Peripheral  Culture-Blood --Culture Results: No growth at 48 Hours      LIPID PROFILE     RADIOLOGY:    CXR: 23:  INTERPRETATION:  Fever and weakness.  AP chest.  Heart exaggerated by AP film shallow inspiration but there is a left ventricular configuration. Cardiac apex obscures evaluation left base. No gross infiltrates or significant pleural effusion.  IMPRESSION: Grossly clear lungs.    CT of Head: 23:  FINDINGS:  There is no CT evidence of acute intracranial hemorrhage, mass effect, midline shift, or acute, large territorial infarct.  The ventricles and sulci are prominent compatible with moderate generalized brain parenchymal volume loss. Patchy periventricular and subcortical white matter hypodensities are nonspecific, although likely due to chronic microangiopathy. The basal cisterns are patent.  There are atherosclerotic calcifications at the skull base.  The mastoid air cells and middle ear cavities are grossly clear. There is mucosal thickening within left ethmoid air cells, and mild mucosal thickening in the visualized left maxillary sinus. There are bilateral ocular lens replacements.  The calvarium and skull base are grossly intact.  IMPRESSION:  No acute intracranial hemorrhage or mass effect.  Chronic changes, as described above.      EK23:  Normal sinus rhythm  Possible Left atrial enlargement  Left axis deviation  Left ventricular hypertrophy  Nonspecific T wave abnormality      TELEMETRY:      ECHO:  Echo in my office on 22 showed Normal LV systolic function with LVEF=60-65% with trace AI, mild PI, MR & TR but otherwise a normal study.

## 2023-09-06 NOTE — PROGRESS NOTE ADULT - ASSESSMENT
83 year old woman with PMHx CAD, GERD, HTN, presents to the ED with weakness, and fever.  Pt states she just generalized does not feel well, also with fevers.  In ED found to be COVID positive.  She was given remdesivir and admitted for further treatment.  At bedside pt persistently weak, feeling lousy.  Denies SOB at this time.    # Febrile illness due to acute COVID19:  - Hypoxia  - Continue Remdesivir  - dexamethason 6mg qd  - Chest xray negative  - supportive care  - PT eval     # Toxic-metabolic encephalopathy:   - due to above  - treat above  - supportive care    # KEN  - Resolved     # HTN  - Stable  - Continue losartan    # HLD:  - continue simvastatin    # Code status:  -Full    # DVT ppx:  -lovenox 
9/5/23:  Pt with above history and weakness due to COVID.  No new cardiac issues and tolerating meds and treatments so far.  No clinical angina or CHF and no indication for further cardiac testing at this time.  Continue as outlined by medicine etal.  Will follow intermittently prn and as an outpt as needed after discharge.    9/6/23:  Resting comfortably this AM.  No new cardiac issues.  [facility].  Stable from a cardiac standpoint so far.  Continue as outlined by medicine etal.  Will follow intermittently prn and as an outpt as needed after discharge from rehab.

## 2023-09-06 NOTE — DISCHARGE NOTE NURSING/CASE MANAGEMENT/SOCIAL WORK - PATIENT PORTAL LINK FT
You can access the FollowMyHealth Patient Portal offered by Health system by registering at the following website: [email or web address]. By joining [employer]’s FollowMyHealth portal, you will also be able to view your health information using other applications (apps) compatible with our system.

## 2023-09-08 LAB
CULTURE RESULTS: SIGNIFICANT CHANGE UP
CULTURE RESULTS: SIGNIFICANT CHANGE UP
SPECIMEN SOURCE: SIGNIFICANT CHANGE UP
SPECIMEN SOURCE: SIGNIFICANT CHANGE UP

## 2023-09-13 DIAGNOSIS — I10 ESSENTIAL (PRIMARY) HYPERTENSION: ICD-10-CM

## 2023-09-13 DIAGNOSIS — M41.9 SCOLIOSIS, UNSPECIFIED: ICD-10-CM

## 2023-09-13 DIAGNOSIS — K21.9 GASTRO-ESOPHAGEAL REFLUX DISEASE WITHOUT ESOPHAGITIS: ICD-10-CM

## 2023-09-13 DIAGNOSIS — I25.10 ATHEROSCLEROTIC HEART DISEASE OF NATIVE CORONARY ARTERY WITHOUT ANGINA PECTORIS: ICD-10-CM

## 2023-09-13 DIAGNOSIS — N17.9 ACUTE KIDNEY FAILURE, UNSPECIFIED: ICD-10-CM

## 2023-09-13 DIAGNOSIS — U07.1 COVID-19: ICD-10-CM

## 2023-09-13 DIAGNOSIS — R09.02 HYPOXEMIA: ICD-10-CM

## 2023-09-13 DIAGNOSIS — G93.41 METABOLIC ENCEPHALOPATHY: ICD-10-CM

## 2023-09-13 DIAGNOSIS — M85.80 OTHER SPECIFIED DISORDERS OF BONE DENSITY AND STRUCTURE, UNSPECIFIED SITE: ICD-10-CM

## 2023-09-13 DIAGNOSIS — E43 UNSPECIFIED SEVERE PROTEIN-CALORIE MALNUTRITION: ICD-10-CM

## 2023-09-13 DIAGNOSIS — Z96.643 PRESENCE OF ARTIFICIAL HIP JOINT, BILATERAL: ICD-10-CM

## 2023-09-13 DIAGNOSIS — Z98.1 ARTHRODESIS STATUS: ICD-10-CM

## 2023-09-13 DIAGNOSIS — I34.0 NONRHEUMATIC MITRAL (VALVE) INSUFFICIENCY: ICD-10-CM

## 2023-09-26 ENCOUNTER — APPOINTMENT (OUTPATIENT)
Dept: DERMATOLOGY | Facility: CLINIC | Age: 83
End: 2023-09-26

## 2024-03-09 ENCOUNTER — EMERGENCY (EMERGENCY)
Facility: HOSPITAL | Age: 84
LOS: 0 days | Discharge: HOME | End: 2024-03-09
Attending: EMERGENCY MEDICINE
Payer: MEDICARE

## 2024-03-09 VITALS
SYSTOLIC BLOOD PRESSURE: 183 MMHG | OXYGEN SATURATION: 96 % | RESPIRATION RATE: 19 BRPM | TEMPERATURE: 98 F | HEART RATE: 63 BPM | DIASTOLIC BLOOD PRESSURE: 80 MMHG

## 2024-03-09 VITALS — HEIGHT: 62 IN | WEIGHT: 125 LBS

## 2024-03-09 DIAGNOSIS — S09.90XA UNSPECIFIED INJURY OF HEAD, INITIAL ENCOUNTER: ICD-10-CM

## 2024-03-09 DIAGNOSIS — Z82.69 FAMILY HISTORY OF OTHER DISEASES OF THE MUSCULOSKELETAL SYSTEM AND CONNECTIVE TISSUE: Chronic | ICD-10-CM

## 2024-03-09 DIAGNOSIS — I25.10 ATHEROSCLEROTIC HEART DISEASE OF NATIVE CORONARY ARTERY WITHOUT ANGINA PECTORIS: ICD-10-CM
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
## 2024-03-09 DIAGNOSIS — E78.00 PURE HYPERCHOLESTEROLEMIA, UNSPECIFIED: ICD-10-CM
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
## 2024-03-09 DIAGNOSIS — Z98.1 ARTHRODESIS STATUS: Chronic | ICD-10-CM

## 2024-03-09 DIAGNOSIS — I10 ESSENTIAL (PRIMARY) HYPERTENSION: ICD-10-CM

## 2024-03-09 PROCEDURE — 73030 X-RAY EXAM OF SHOULDER: CPT | Mod: 26,LT

## 2024-03-09 PROCEDURE — 70450 CT HEAD/BRAIN W/O DYE: CPT | Mod: MC

## 2024-03-09 PROCEDURE — 73030 X-RAY EXAM OF SHOULDER: CPT | Mod: LT

## 2024-03-09 PROCEDURE — 72125 CT NECK SPINE W/O DYE: CPT | Mod: 26,MC

## 2024-03-09 PROCEDURE — 99284 EMERGENCY DEPT VISIT MOD MDM: CPT | Mod: 25

## 2024-03-09 PROCEDURE — 70450 CT HEAD/BRAIN W/O DYE: CPT | Mod: 26,MC

## 2024-03-09 PROCEDURE — 72125 CT NECK SPINE W/O DYE: CPT | Mod: MC

## 2024-03-09 PROCEDURE — 99285 EMERGENCY DEPT VISIT HI MDM: CPT

## 2024-03-09 NOTE — ED ADULT NURSE NOTE - NSFALLHARMRISKINTERV_ED_ALL_ED

## 2024-03-09 NOTE — ED PROVIDER NOTE - OBJECTIVE STATEMENT
83-year-old female history of hypertension high cholesterol CAD presents to the emergency department status post fall.  Patient  exposed to use a walker.  Walked her closet to get it "without using her walker fell backwards onto the left side hit head and left shoulder was brought in by family for evaluation no LOC no vomiting no chest pain no abdominal pain no back pain.  Exam with tenderness palpation of the left shoulder otherwise nonfocal neuro alert called on arrival will CT head and neck x-ray shoulder patient with mechanical fall because she was not using her walker will obtain imaging and reassess.

## 2024-03-09 NOTE — ED PROVIDER NOTE - CLINICAL SUMMARY MEDICAL DECISION MAKING FREE TEXT BOX
83-year-old female history of hypertension high cholesterol CAD presents to the emergency department status post fall.  Patient  exposed to use a walker.  Walked her closet to get it "without using her walker fell backwards onto the left side hit head and left shoulder was brought in by family for evaluation no LOC no vomiting no chest pain no abdominal pain no back pain.  Exam with tenderness palpation of the left shoulder otherwise nonfocal neuro alert called on arrival will CT head and neck x-ray shoulder patient with mechanical fall because she was not using her walker will obtain imaging and reassess. 83-year-old female history of hypertension high cholesterol CAD presents to the emergency department status post fall.  Patient  exposed to use a walker.  Walked her closet to get it "without using her walker fell backwards onto the left side hit head and left shoulder was brought in by family for evaluation no LOC no vomiting no chest pain no abdominal pain no back pain.  Exam with tenderness palpation of the left shoulder otherwise nonfocal neuro alert called on arrival will CT head and neck x-ray shoulder patient with mechanical fall because she was not using her walker will obtain imaging and reassess.  207CT head and neck negative x-ray independently interpreted by myself and is negative.  I reassessed patient full range of motion of all extremities no tenderness to palpation she is ambulating at baseline family at bedside comfortable taking patient home discussed rehab they do not want this at this time they agree that this was mechanical fall backwards they do not think she needs a medical workup at this time I agree based on the story will DC with follow-up and strict return precautions.

## 2024-03-09 NOTE — ED ADULT NURSE NOTE - OBJECTIVE STATEMENT
pt. comes to ED s/p mechanical fall from standing height. pt. complains of left shoulder pain, + head strike. - LOC, no AC.

## 2024-03-09 NOTE — ED PROVIDER NOTE - PHYSICAL EXAMINATION
Constitutional: NAD   Eyes: PERRLA EOMI  Head: Normocephalic atraumatic  ENT: No marie sign, no raccoon eyes   Mouth: MMM  Cardiac: regular rate   Resp: unlabored breathing clear b/l   GI: Abd s/nt/nd  Neuro: grossly normal and intact GCS 15 no neuro deficits  Skin: No rashes, no bruising to chest, back, abdomen or extremities  Msk: No midline spinal ttpno ttp of facial bones, no ttp to chest wall, pelvis stable, + ttp left shoulder

## 2024-03-09 NOTE — ED PROVIDER NOTE - PATIENT PORTAL LINK FT
You can access the FollowMyHealth Patient Portal offered by [facility] by registering at the following website: [email or web address]. By joining [employer]’s FollowMyHealth portal, you will also be able to view your health information using other applications (apps) compatible with our system.

## 2024-03-09 NOTE — ED ADULT TRIAGE NOTE - CHIEF COMPLAINT QUOTE
patient presents c/o fall from standing height.  patient fell onto left side.  + head strike.  no LOC.  no anticoagulation use.  c/o left shoulder pain. neuro alert initiated in triage, sent directly to CT.

## 2024-03-10 NOTE — ED POST DISCHARGE NOTE - DETAILS
Called pt but the woman who picked up the phone would not answer any questions. Called daughter, daughter was made aware. Advised daughter to place her in a sling, avoid using the arm for strenuous activities and f/u with ortho. Daughter aware and agree with plan. -Jarvis Lauren PA-C

## 2024-03-11 ENCOUNTER — APPOINTMENT (OUTPATIENT)
Dept: ORTHOPEDIC SURGERY | Facility: CLINIC | Age: 84
End: 2024-03-11
Payer: MEDICARE

## 2024-03-11 VITALS — BODY MASS INDEX: 23 KG/M2 | WEIGHT: 125 LBS | HEIGHT: 62 IN

## 2024-03-11 PROCEDURE — 73000 X-RAY EXAM OF COLLAR BONE: CPT | Mod: LT

## 2024-03-11 PROCEDURE — 99204 OFFICE O/P NEW MOD 45 MIN: CPT

## 2024-03-11 NOTE — REASON FOR VISIT
[Family Member] : family member [FreeTextEntry2] : This is an 83 year old F with a left clavicle fracture after a fall on 3/9/24.  Her daughter took her to , and had negative humerus films.  Head CTs were negative.  She was d/c then on 3/11/24, the radiologist reached out and reported the clavicle fracture.  She has been in a sling.  She is in a transfer chair today.  She is doing OK, overall.

## 2024-03-11 NOTE — IMAGING
[Left] : left shoulder [FreeTextEntry3] : There is a distal clavicle fracture with decent alignment.

## 2024-03-11 NOTE — HISTORY OF PRESENT ILLNESS
[de-identified] : 82yo F here for left clavicle Fx. Pt fell on 3/9/24. Went to [facility] the same day, had XR of the humerus - revealed clavicle Fx. Wearing sling.

## 2024-03-11 NOTE — PHYSICAL EXAM
[Left] : left shoulder [] : no sensory deficits [FreeTextEntry9] : Range of motion was not assessed. [de-identified] : Strength was not assessed.

## 2024-03-11 NOTE — ASSESSMENT
[FreeTextEntry1] : We reviewed the findings and the history. Questions were answered and concerns addressed. The options were outlined. She will follow up in 2 weeks with repeat xrays.  Sling was given.  She is able to do activity as tolerated.  Tylenol use discussed.   Patient was seen by Dr. Wally Short. Patient was seen by Carla GUY under the supervision of Dr. Wally Short. Progress note was completed by Carla GUY. Entered by Laurie Lara acting as scribe.
yes

## 2024-03-25 ENCOUNTER — APPOINTMENT (OUTPATIENT)
Dept: ORTHOPEDIC SURGERY | Facility: CLINIC | Age: 84
End: 2024-03-25

## 2024-03-27 ENCOUNTER — APPOINTMENT (OUTPATIENT)
Dept: ORTHOPEDIC SURGERY | Facility: CLINIC | Age: 84
End: 2024-03-27
Payer: MEDICARE

## 2024-03-27 VITALS — WEIGHT: 125 LBS | HEIGHT: 62 IN | BODY MASS INDEX: 23 KG/M2

## 2024-03-27 DIAGNOSIS — S42.032A DISPLACED FRACTURE OF LATERAL END OF LEFT CLAVICLE, INITIAL ENCOUNTER FOR CLOSED FRACTURE: ICD-10-CM

## 2024-03-27 PROCEDURE — 99214 OFFICE O/P EST MOD 30 MIN: CPT

## 2024-03-27 PROCEDURE — 73000 X-RAY EXAM OF COLLAR BONE: CPT | Mod: LT

## 2024-03-27 NOTE — IMAGING
[Left] : left shoulder [FreeTextEntry3] : There is a distal clavicle fracture with decent alignment.  This is unchanged.

## 2024-03-27 NOTE — PHYSICAL EXAM
[Left] : left shoulder [Sitting] : sitting [] : no sensory deficits [FreeTextEntry8] : This is slight. [de-identified] : Strength was not assessed. [TWNoteComboBox4] : passive forward flexion 120 degrees

## 2024-03-27 NOTE — ASSESSMENT
[FreeTextEntry1] : We discussed her course. She is progressing. POT is prescribed. Caution discussed. Questions answered.  Patient was seen by Dr. Wally Short. Patient was seen by Carla GUY under the supervision of Dr. Wally Short. Progress note was completed by Carla GUY.

## 2024-03-27 NOTE — REASON FOR VISIT
[Family Member] : family member [FreeTextEntry2] : This is an 83 year old F with a left clavicle fracture after a fall on 3/9/24.  Her daughter took her to , and had negative humerus films.  Head CTs were negative.  She was d/c then on 3/11/24, the radiologist reached out and reported the clavicle fracture.  She is doing OK.  There is minimal pain.

## 2024-03-27 NOTE — HISTORY OF PRESENT ILLNESS
[0] : 0 [Retired] : Work status: retired [de-identified] : Patient is here for a follow up on her left clavicle fracture. [FreeTextEntry1] : Left clavicle [FreeTextEntry9] : Tylenol

## 2024-05-15 ENCOUNTER — APPOINTMENT (OUTPATIENT)
Dept: ORTHOPEDIC SURGERY | Facility: CLINIC | Age: 84
End: 2024-05-15
Payer: MEDICARE

## 2024-05-15 VITALS — HEIGHT: 62 IN | BODY MASS INDEX: 23 KG/M2 | WEIGHT: 125 LBS

## 2024-05-15 DIAGNOSIS — S42.032D DISPLACED FRACTURE OF LATERAL END OF LEFT CLAVICLE, SUBSEQUENT ENCOUNTER FOR FRACTURE WITH ROUTINE HEALING: ICD-10-CM

## 2024-05-15 PROCEDURE — 99214 OFFICE O/P EST MOD 30 MIN: CPT

## 2024-05-15 PROCEDURE — 73000 X-RAY EXAM OF COLLAR BONE: CPT | Mod: LT

## 2024-05-15 NOTE — ASSESSMENT
[FreeTextEntry1] : We discussed her course.  Due to weakness, more PT is prescribed.  They will call for follow up. Their questions answered.   Patient was seen by Dr. Wally Short. Entered by Laurie Lara acting as scribe.

## 2024-05-15 NOTE — REASON FOR VISIT
[Family Member] : family member [FreeTextEntry2] : This is an 84 year old F with a left clavicle fracture after a fall on 3/9/24.  Her daughter took her to , and had negative humerus films.  Head CTs were negative.  She was d/c then on 3/11/24, the radiologist reached out and reported the clavicle fracture.  She is doing OK.  There is minimal pain. She has continued with home PT. She takes Cymbalta for other things that helps the shoulder.

## 2024-05-15 NOTE — HISTORY OF PRESENT ILLNESS
[Retired] : Work status: retired [de-identified] : Patient is here for a follow up on her left clavicle fracture. She has very little pain. [FreeTextEntry1] : Left clavicle [de-identified] : Home PT 2 x a week

## 2024-05-15 NOTE — PHYSICAL EXAM
[Left] : left shoulder [Sitting] : sitting [] : no sensory deficits [FreeTextEntry8] : nontender to percussion.   [de-identified] : Strength was not assessed. [TWNoteComboBox4] : passive forward flexion 150 degrees [de-identified] : external rotation 50 degrees

## 2024-08-22 NOTE — ED PROVIDER NOTE - WR INTERPRETATION DATE TIME  1
Population Health Chart Review & Patient Outreach Details      Additional [facility] Health Notes:      [place] sent for colonoscopy         Updates Requested / Reviewed:      Other    Dr Coughlin         Health Maintenance Topics Overdue:      [facility] Score: 1     Colon Cancer Screening                       Health Maintenance Topic(s) Outreach Outcomes & Actions Taken:    Colorectal Cancer Screening - Outreach Outcomes & Actions Taken  : External Records Requested & Care Team Updated if Applicable   09-Mar-2024 13:59

## 2024-11-23 NOTE — PHYSICAL THERAPY INITIAL EVALUATION ADULT - PHYSICAL ASSIST/NONPHYSICAL ASSIST: GAIT, REHAB EVAL
verbal cues/nonverbal cues (demo/gestures)/1 person assist
0 = swallows foods/liquids without difficulty

## 2025-02-20 NOTE — ED ADULT NURSE NOTE - FINAL NURSING ELECTRONIC SIGNATURE
"Subjective:      Patient ID: Shirley Thomas is a 41 y.o. female.    Vitals:  height is 5' 8" (1.727 m) and weight is 97.5 kg (215 lb). Her oral temperature is 97.8 °F (36.6 °C). Her blood pressure is 117/69 and her pulse is 80. Her respiration is 16 and oxygen saturation is 100%.     Chief Complaint: Sinus Problem    Pt reports cough, congestion, slight sore throat, sinus pressure since yesterday. Denies known fever at home. Pt also reports left ear pain over one month.    Sinusitis  This is a new problem. The current episode started yesterday. The problem has been gradually worsening since onset. There has been no fever. Her pain is at a severity of 4/10. The pain is mild. Associated symptoms include congestion, coughing, ear pain, headaches, sinus pressure and a sore throat. Pertinent negatives include no chills. Treatments tried: theraflu, mucinex.       Constitution: Negative for chills, fatigue and fever.   HENT:  Positive for ear pain, congestion, postnasal drip, sinus pressure and sore throat.    Respiratory:  Positive for cough.    Neurological:  Positive for headaches.      Objective:     Vitals:    02/20/25 0806   BP: 117/69   BP Location: Left arm   Patient Position: Sitting   Pulse: 80   Resp: 16   Temp: 97.8 °F (36.6 °C)   TempSrc: Oral   SpO2: 100%   Weight: 97.5 kg (215 lb)   Height: 5' 8" (1.727 m)       Physical Exam   Constitutional: She is oriented to person, place, and time. She appears well-developed. She is cooperative.  Non-toxic appearance. She does not appear ill. No distress.   HENT:   Head: Normocephalic and atraumatic.   Ears:   Right Ear: Hearing, tympanic membrane, external ear and ear canal normal.   Left Ear: Hearing, tympanic membrane, external ear and ear canal normal.   Nose: Congestion present. No mucosal edema, rhinorrhea or nasal deformity. No epistaxis. Right sinus exhibits no maxillary sinus tenderness and no frontal sinus tenderness. Left sinus exhibits no maxillary " sinus tenderness and no frontal sinus tenderness.   Mouth/Throat: Uvula is midline, oropharynx is clear and moist and mucous membranes are normal. No trismus in the jaw. Normal dentition. No uvula swelling. No oropharyngeal exudate, posterior oropharyngeal edema or posterior oropharyngeal erythema. Oropharynx is clear.   Eyes: Conjunctivae and lids are normal. No scleral icterus.   Neck: Trachea normal and phonation normal. Neck supple. No edema present. No erythema present. No neck rigidity present.   Cardiovascular: Normal rate, regular rhythm, normal heart sounds and normal pulses.   Pulmonary/Chest: Effort normal and breath sounds normal. No stridor. No respiratory distress. She has no decreased breath sounds. She has no wheezes. She has no rhonchi. She has no rales. She exhibits no tenderness.   Abdominal: Normal appearance.   Musculoskeletal: Normal range of motion.         General: No deformity. Normal range of motion.   Neurological: She is alert and oriented to person, place, and time. She exhibits normal muscle tone. Coordination normal.   Skin: Skin is warm, dry, intact, not diaphoretic, not pale and no rash. Capillary refill takes less than 2 seconds.   Psychiatric: Her speech is normal and behavior is normal. Mood, judgment and thought content normal.   Nursing note and vitals reviewed.      Assessment:     1. Viral URI with cough    2. Sinus pressure    3. Cough with congestion of paranasal sinus    4. Sinus headache    5. Acute sore throat      Results for orders placed or performed in visit on 02/20/25   POCT Influenza A/B Molecular    Collection Time: 02/20/25  8:37 AM   Result Value Ref Range    POC Molecular Influenza A Ag Negative Negative    POC Molecular Influenza B Ag Negative Negative     Acceptable Yes    SARS Coronavirus 2 Antigen, POCT Manual Read    Collection Time: 02/20/25  8:39 AM   Result Value Ref Range    SARS Coronavirus 2 Antigen Negative Negative, Presumptive  Negative     Acceptable Yes        Plan:       Viral URI with cough  -     pyrilamine-phenylephrine-DM 12.5-5-7.5 mg/5 mL Liqd; Take 10 mLs by mouth 2 (two) times a day. for 10 days  Dispense: 473 mL; Refill: 0    Sinus pressure  -     SARS Coronavirus 2 Antigen, POCT Manual Read  -     POCT Influenza A/B Molecular    Cough with congestion of paranasal sinus    Sinus headache    Acute sore throat          Medical Decision Making:   Initial Assessment:   VSS  Denies fevers  States that she has tried Theraflu, mucinex, albuterol inhaler  Denies CP or SOB  States that she is blowing yellow snot out of her nose   Reports exposure to the flu last week   Clinical Tests:   Lab Tests: Ordered and Reviewed  Urgent Care Management:  See entire note for further details    Discussed with pt all pertinent UC information and results. Discussed pt dx and plan of tx.   Gave pt all f/u and return to the UC instructions. All questions and concerns were addressed at this time.   Pt expresses understanding of information and instructions, and is comfortable with plan to discharge.   Pt is stable for discharge.     I discussed with patient and/or family/caretaker that evaluation in the UC does not suggest any emergent or life threatening medical conditions requiring immediate intervention beyond what was provided in the UC, and I believe patient is safe for discharge.  Regardless, an unremarkable evaluation in the UC does not preclude the development or presence of a serious or life threatening condition. As such, patient was instructed to GO to ER immediately for any worsening or change in current symptoms.             Patient Instructions   VIRAL URI: OVER THE COUNTER RECOMMENDATIONS/SUGGESTIONS--if needed      SORE THROAT:    You may gargle with hot salt water 4 times a day for the next 2 days once to twice daily to alleviate some of your throat discomfort AND/OR post nasal drip.  Drink plenty of fluids; recommend warm  tea with honey.     YOU MAY USE OVER-THE-COUNTER CEPACOL FOR SOOTHING OF YOUR THROAT.  You may wish to avoid spicy food, citrus fruits, and red sauces- as this may irritate the throat more.    You can also take a daily anti-histamine such as Zyrtec, Claritin, Xyzal, OR Allegra-IN DAYTIME; NON DROWSY) AND/OR Benadryl- AT NIGHT; DROWSY) to help with runny nose/sneezing/sore throat/cough. Remember to switch antihistamines every 3 months, if taken daily.     COUGH:      Make sure you are getting rest and drinking lots of fluids.    You can use cough drops (recommend ricola lemon mint honey) or Cepacol to soothe your sore throat.     You can also take Elderberry and/or Emergen-C (vitamin C) to help boost your immune system.    RX POLYTUSSIN AS DIRECTED To help with runny nose/sneezing/sore throat/cough.     Honey is a natural cough suppressant that can be used.    If your symptoms do not improve, you should return to this clinic. If your symptoms worsen, go to the emergency room.         CONGESTION:  Make sure to stay well hydrated.    Use Nasal Saline to mechanically move any post nasal drip from your eustachian tube or from the back of your throat.        PAIN/DISCOMFORT:  Tylenol up to 4,000 mg a day is safe for short periods and can be used for headache, body aches, pain, and fever. However in high doses and prolonged use it can cause liver irritation.    Ibuprofen is a non-steroidal anti-inflammatory that can be used for headache, body aches, pain, and fever. However it can also cause stomach irritation if over used.    If you have been discharged from the clinic prior to your point of care test results being completed, please make sure to check [facility].  If there is a change in treatment, we will communicate with you through here.  If your test is positive, and medications are ordered, these will be sent to your preferred pharmacy.   If your test is negative, no further steps needed. If you do not hear  from us or have questions, please call the clinic.      - You must understand that you have received an Urgent Care treatment only and that you may be released before all of your medical problems are known or treated.   - You, the patient, will arrange for follow up care as instructed with your primary care provider or recommended specialist.   - If your condition worsens or fails to improve we recommend that you receive another evaluation at the ER immediately or contact your PCP to discuss your concerns, or return here.   - Please do not drive or make any important decisions for 24 hours if you have received any pain medications, sedatives or mood altering drugs during your visit.    Disclaimer: This document was drafted with the use of a voice recognition device and is likely to have sound alike errors.          09-Mar-2024 14:29

## 2025-06-09 NOTE — ED ADULT NURSE NOTE - NSICDXPASTMEDICALHX_GEN_ALL_CORE_FT
86.6
PAST MEDICAL HISTORY:  CAD (coronary artery disease)     Cardiac murmur     GERD (gastroesophageal reflux disease)     HTN (hypertension)     Hypercholesteremia     Mitral regurgitation     Osteopenia     Scoliosis

## 2025-09-06 ENCOUNTER — NON-APPOINTMENT (OUTPATIENT)
Age: 85
End: 2025-09-06

## 2025-09-08 ENCOUNTER — APPOINTMENT (OUTPATIENT)
Dept: DERMATOLOGY | Facility: CLINIC | Age: 85
End: 2025-09-08
Payer: MEDICARE

## 2025-09-08 DIAGNOSIS — L30.9 DERMATITIS, UNSPECIFIED: ICD-10-CM

## 2025-09-08 PROCEDURE — 99204 OFFICE O/P NEW MOD 45 MIN: CPT

## 2025-09-11 LAB
BMZ BP180 IGG SERPL-ACNC: >200 RU/ML
BMZ BP230 IGG SERPL-ACNC: 98 RU/ML

## 2025-09-15 ENCOUNTER — APPOINTMENT (OUTPATIENT)
Dept: DERMATOLOGY | Facility: CLINIC | Age: 85
End: 2025-09-15
Payer: MEDICARE

## 2025-09-15 DIAGNOSIS — L12.0 BULLOUS PEMPHIGOID: ICD-10-CM

## 2025-09-15 PROCEDURE — 96372 THER/PROPH/DIAG INJ SC/IM: CPT

## 2025-09-15 RX ORDER — CLOBETASOL PROPIONATE 0.5 MG/G
0.05 OINTMENT TOPICAL
Qty: 5 | Refills: 3 | Status: ACTIVE | COMMUNITY
Start: 2025-09-08 | End: 1900-01-01

## 2025-09-15 RX ORDER — PREDNISONE 10 MG/1
10 TABLET ORAL
Qty: 31 | Refills: 0 | Status: ACTIVE | COMMUNITY
Start: 2025-09-15 | End: 1900-01-01

## 2025-09-15 RX ORDER — TRIAMCINOLONE ACETONIDE 1 MG/G
0.1 OINTMENT TOPICAL
Qty: 1 | Refills: 3 | Status: ACTIVE | COMMUNITY
Start: 2025-09-15 | End: 1900-01-01